# Patient Record
Sex: FEMALE | Race: WHITE | NOT HISPANIC OR LATINO | URBAN - METROPOLITAN AREA
[De-identification: names, ages, dates, MRNs, and addresses within clinical notes are randomized per-mention and may not be internally consistent; named-entity substitution may affect disease eponyms.]

---

## 2020-07-01 VITALS — WEIGHT: 140 LBS | HEIGHT: 62 IN

## 2020-08-04 ENCOUNTER — APPOINTMENT (RX ONLY)
Dept: URBAN - METROPOLITAN AREA CLINIC 141 | Facility: CLINIC | Age: 60
Setting detail: DERMATOLOGY
End: 2020-08-04

## 2020-08-04 ENCOUNTER — APPOINTMENT (RX ONLY)
Dept: URBAN - METROPOLITAN AREA SURGERY CENTER 10 | Facility: SURGERY CENTER | Age: 60
Setting detail: DERMATOLOGY
End: 2020-08-04

## 2020-08-04 VITALS
WEIGHT: 140 LBS | DIASTOLIC BLOOD PRESSURE: 78 MMHG | HEIGHT: 62 IN | HEART RATE: 66 BPM | SYSTOLIC BLOOD PRESSURE: 137 MMHG

## 2020-08-04 VITALS
HEART RATE: 66 BPM | WEIGHT: 140 LBS | HEIGHT: 62 IN | DIASTOLIC BLOOD PRESSURE: 78 MMHG | SYSTOLIC BLOOD PRESSURE: 137 MMHG

## 2020-08-04 PROBLEM — C44.612 BASAL CELL CARCINOMA OF SKIN OF RIGHT UPPER LIMB, INCLUDING SHOULDER: Status: ACTIVE | Noted: 2020-08-04

## 2020-08-04 PROCEDURE — 17313 MOHS 1 STAGE T/A/L: CPT

## 2020-08-04 PROCEDURE — ? MOHS SURGERY

## 2020-08-04 PROCEDURE — ? PRESCRIPTION

## 2020-08-04 PROCEDURE — 12034 INTMD RPR S/TR/EXT 7.6-12.5: CPT

## 2020-08-04 PROCEDURE — ? REPAIR NOTE

## 2020-08-04 RX ORDER — CEPHALEXIN 500 MG/1
CAPSULE ORAL TID
Qty: 30 | Refills: 0 | Status: ERX | COMMUNITY
Start: 2020-08-04

## 2020-08-04 RX ORDER — FLUCONAZOLE 150 MG/1
TABLET ORAL
Qty: 1 | Refills: 0 | Status: ERX | COMMUNITY
Start: 2020-08-04

## 2020-08-04 RX ADMIN — CEPHALEXIN: 500 CAPSULE ORAL at 00:00

## 2020-08-04 RX ADMIN — FLUCONAZOLE: 150 TABLET ORAL at 00:00

## 2020-08-04 NOTE — PROCEDURE: MOHS SURGERY
Biopsy Photograph Reviewed: Yes
Consent Type: Consent 1 (Standard)
Eye Shield Used: No
Initial Size Of Lesion: 3.3
X Size Of Lesion In Cm (Optional): 2.1
Number Of Stages: 1
Primary Defect Length In Cm (Final Defect Size - Required For Flaps/Grafts): 4.4
Primary Defect Width In Cm (Final Defect Size - Required For Flaps/Grafts): 3.7
Repair Type: Referred to ASC for closure
Oculoplastic Surgeon Procedure Text (A): After obtaining clear surgical margins the patient was sent to oculoplastics for surgical repair.  The patient understands they will receive post-surgical care and follow-up from the referring physician's office.
Oculoplastic Surgeon Procedure Text (B): After obtaining clear surgical margins the patient was sent to oculoplastics for surgical repair.  The patient understands they will receive post-surgical care and follow-up from the referring physician's office.
Otolaryngologist Procedure Text (A): After obtaining clear surgical margins the patient was sent to otolaryngology for surgical repair.  The patient understands they will receive post-surgical care and follow-up from the referring physician's office.
Otolaryngologist Procedure Text (B): After obtaining clear surgical margins the patient was sent to otolaryngology for surgical repair.  The patient understands they will receive post-surgical care and follow-up from the referring physician's office.
Plastic Surgeon Procedure Text (A): After obtaining clear surgical margins the patient was sent to plastics for surgical repair.  The patient understands they will receive post-surgical care and follow-up from the referring physician's office.
Plastic Surgeon Procedure Text (B): After obtaining clear surgical margins the patient was sent to plastics for surgical repair.  The patient understands they will receive post-surgical care and follow-up from the referring physician's office.
Mid-Level Procedure Text (A): After obtaining clear surgical margins the patient was sent to a mid-level provider for surgical repair.  The patient understands they will receive post-surgical care and follow-up from the mid-level provider.
Mid-Level Procedure Text (B): After obtaining clear surgical margins the patient was sent to a mid-level provider for surgical repair.  The patient understands they will receive post-surgical care and follow-up from the mid-level provider.
Provider Procedure Text (A): After obtaining clear surgical margins the defect was repaired by another provider.
Asc Procedure Text (A): After obtaining clear surgical margins the patient was sent to an ASC for surgical repair.  The patient understands they will receive post-surgical care and follow-up from the ASC physician.
Asc Procedure Text (B): After obtaining clear surgical margins the patient was sent to an ASC for surgical repair.  The patient understands they will receive post-surgical care and follow-up from the ASC physician.
Suturegard Retention Suture: 2-0 Nylon
Retention Suture Bite Size: 3 mm
Length To Time In Minutes Device Was In Place: 10
Simple / Intermediate / Complex Repair - Final Wound Length In Cm: 0
Undermining Type: Entire Wound
Debridement Text: The wound edges were debrided prior to proceeding with the closure to facilitate wound healing.
Helical Rim Text: The closure involved the helical rim.
Vermilion Border Text: The closure involved the vermilion border.
Nostril Rim Text: The closure involved the nostril rim.
Retention Suture Text: Retention sutures were placed to support the closure and prevent dehiscence.
Location Indication Override (Is Already Calculated Based On Selected Body Location): Area L
Area H Indication Text: Tumors in this location are included in Area H (eyelids, eyebrows, nose, lips, chin, ear, pre-auricular, post-auricular, temple, genitalia, hands, feet, ankles and areola). Tissue conservation is critical in these anatomic locations. Also, tumors outside of this area but with aggressive features or in the setting of immunosuppression would qualify for Mohs surgery.
Area M Indication Text: Tumors in this location are included in Area M (cheek, forehead, scalp, neck, jawline and pretibial skin).  Mohs surgery is indicated for tumors in these anatomic locations.
Area L Indication Text: Tumors in this location are included in Area L (trunk and extremities).  Mohs surgery is indicated for larger tumors, or tumors with aggressive histologic features, in these anatomic locations.
Tumor Debulked?: curette
Depth Of Tumor Invasion (For Histology): tumor not visualized (deep and peripheral margins are clear of tumor)
Perineural Invasion (For Histology - Be Specific If Possible): absent
Special Stains Stage 1 - Results: Base On Clearance Noted Above
Stage 2: Additional Anesthesia Type: 1% lidocaine with epinephrine
Include Tumor Staging In Mohs Note?: Please Select the Appropriate Response
Staging Info: By selecting yes to the question above you will include information on AJCC 8 tumor staging in your Mohs note. Information on tumor staging will be automatically added for SCCs on the head and neck. AJCC 8 includes tumor size, tumor depth, perineural involvement and bone invasion.
Tumor Depth: Less than 6mm from granular layer and no invasion beyond the subcutaneous fat
Medical Necessity Statement: Based on my medical judgement, Mohs surgery is the most appropriate treatment for this cancer compared to alternative treatments.
Alternatives Discussed Intro (Do Not Add Period): I discussed alternative treatments to Mohs surgery and specifically discussed the risks and benefits of
Consent 1/Introductory Paragraph: The rationale for Mohs surgery was explained to the patient, and informed consent was obtained.  The risks, benefits and alternatives to therapy were discussed in detail. Specifically, the risks of infection, scarring, bleeding, prolonged wound healing, incomplete removal, allergy to anesthesia, bruising and/or swelling, nerve injury and recurrence were addressed. Prior to the procedure, the treatment site was clearly identified and confirmed by the patient. All components of Universal Protocol/PAUSE Rule completed.
Consent 2/Introductory Paragraph: Mohs surgery was explained to the patient, and informed consent was obtained. The risks, benefits and alternatives to therapy were discussed in detail. Specifically, the risks of infection, scarring, bleeding, prolonged wound healing, incomplete removal, allergy to anesthesia, bruising and/or swelling, nerve injury and recurrence were addressed. Prior to the procedure, the treatment site was clearly identified and confirmed by the patient. All components of Universal Protocol/PAUSE Rule completed.
Consent 3/Introductory Paragraph: I gave the patient a chance to ask questions they had about the procedure.  Following this I explained the Mohs procedure and consent was obtained. The risks, benefits and alternatives to therapy were discussed in detail. Specifically, the risks of infection, scarring, bleeding, prolonged wound healing, incomplete removal, allergy to anesthesia, bruising and/or swelling, nerve injury and recurrence were addressed. Prior to the procedure, the treatment site was clearly identified and confirmed by the patient. All components of Universal Protocol/PAUSE Rule completed.
Consent (Temporal Branch)/Introductory Paragraph: The rationale for Mohs surgery was explained to the patient, and informed consent was obtained. The risks, benefits and alternatives to therapy were discussed in detail. Specifically, the risks of damage to the temporal branch of the facial nerve, infection, scarring, bleeding, prolonged wound healing, bruising and/or swelling, incomplete removal, allergy to anesthesia, and recurrence were addressed. Prior to the procedure, the treatment site was clearly identified and confirmed by the patient. All components of Universal Protocol/PAUSE Rule completed.
Consent (Marginal Mandibular)/Introductory Paragraph: The rationale for Mohs surgery was explained to the patient, and informed consent was obtained. The risks, benefits and alternatives to therapy were discussed in detail. Specifically, the risks of damage to the marginal mandibular branch of the facial nerve, bruising and/or swelling, infection, scarring, bleeding, prolonged wound healing, incomplete removal, allergy to anesthesia, and recurrence were addressed. Prior to the procedure, the treatment site was clearly identified and confirmed by the patient. All components of Universal Protocol/PAUSE Rule completed.
Consent (Spinal Accessory)/Introductory Paragraph: The rationale for Mohs surgery was explained to the patient, and informed consent was obtained. The risks, benefits and alternatives to therapy were discussed in detail. Specifically, the risks of damage to the spinal accessory nerve, infection, scarring, bleeding, prolonged wound healing, incomplete removal, bruising and/or swelling, allergy to anesthesia, and recurrence were addressed. Prior to the procedure, the treatment site was clearly identified and confirmed by the patient. All components of Universal Protocol/PAUSE Rule completed.
Consent (Near Eyelid Margin)/Introductory Paragraph: The rationale for Mohs surgery was explained to the patient, and informed consent was obtained. The risks, benefits and alternatives to therapy were discussed in detail. Specifically, the risks of ectropion or eyelid deformity, infection, scarring, bleeding, prolonged wound healing, incomplete removal, allergy to anesthesia, bruising and/or swelling, nerve injury and recurrence were addressed. Prior to the procedure, the treatment site was clearly identified and confirmed by the patient. All components of Universal Protocol/PAUSE Rule completed.
Consent (Ear)/Introductory Paragraph: The rationale for Mohs surgery was explained to the patient, and informed consent was obtained. The risks, benefits and alternatives to therapy were discussed in detail. Specifically, the risks of ear deformity, infection, scarring, bleeding, prolonged wound healing, incomplete removal, allergy to anesthesia, bruising and/or swelling, nerve injury and recurrence were addressed. Prior to the procedure, the treatment site was clearly identified and confirmed by the patient. All components of Universal Protocol/PAUSE Rule completed.
Consent (Nose)/Introductory Paragraph: The rationale for Mohs surgery was explained to the patient, and informed consent was obtained. The risks, benefits and alternatives to therapy were discussed in detail. Specifically, the risks of nasal deformity, changes in the flow of air through the nose, infection, scarring, bleeding, prolonged wound healing, incomplete removal, allergy to anesthesia, bruising and/or swelling, nerve injury and recurrence were addressed. Prior to the procedure, the treatment site was clearly identified and confirmed by the patient. All components of Universal Protocol/PAUSE Rule completed.
Consent (Lip)/Introductory Paragraph: The rationale for Mohs surgery was explained to the patient, and informed consent was obtained. The risks, benefits and alternatives to therapy were discussed in detail. Specifically, the risks of lip deformity, changes in the oral aperture, infection, scarring, bleeding, prolonged wound healing, incomplete removal, allergy to anesthesia, bruising and/or swelling, dental/diet precautions, nerve injury and recurrence were addressed. Prior to the procedure, the treatment site was clearly identified and confirmed by the patient. All components of Universal Protocol/PAUSE Rule completed.
Consent (Scalp)/Introductory Paragraph: The rationale for Mohs surgery was explained to the patient, and informed consent was obtained. The risks, benefits and alternatives to therapy were discussed in detail. Specifically, the risks of changes in hair growth pattern secondary to repair, infection, scarring, bleeding, prolonged wound healing, incomplete removal, allergy to anesthesia, bruising and/or swelling, nerve injury and recurrence were addressed. Prior to the procedure, the treatment site was clearly identified and confirmed by the patient. All components of Universal Protocol/PAUSE Rule completed.
Detail Level: Detailed
Postop Diagnosis: same
Surgeon: Rashida Segovia M.D.
Anesthesia Type: 1% lidocaine with 1:100,000 epinephrine and a 1:10 solution of 8.4% sodium bicarbonate
Anesthesia Volume In Cc: 15
Hemostasis: Electrocautery
Estimated Blood Loss (Cc): minimal
Epidermal Sutures: 5-0 Plain Gut
Epidermal Closure: simple interrupted
Suturegard Intro: Intraoperative tissue expansion was performed, utilizing the SUTUREGARD device, in order to reduce wound tension.
Suturegard Body: The suture ends were repeatedly re-tightened and re-clamped to achieve the desired tissue expansion.
Hemigard Intro: Due to skin fragility and wound tension, it was decided to use HEMIGARD adhesive retention suture devices to permit a linear closure. The skin was cleaned and dried for a 6cm distance away from the wound. Excessive hair, if present, was removed to allow for adhesion.
Hemigard Postcare Instructions: The HEMIGARD strips are to remain completely dry for at least 5-7 days.
Donor Site Anesthesia Type: same as repair anesthesia
Closure 2 Information: This tab is for additional flaps and grafts, including complex repair and grafts and complex repair and flaps. You can also specify a different location for the additional defect, if the location is the same you do not need to select a new one. We will insert the automated text for the repair you select below just as we do for solitary flaps and grafts. Please note that at this time if you select a location with a different insurance zone you will need to override the ICD10 and CPT if appropriate.
Closure 3 Information: This tab is for additional flaps and grafts above and beyond our usual structured repairs.  Please note if you enter information here it will not currently bill and you will need to add the billing information manually.
Closure 4 Information: This tab is for additional flaps and grafts above and beyond our usual structured repairs.  Please note if you enter information here it will not currently bill and you will need to add the billing information manually.
Wound Care: Vaseline
Dressing: pressure dressing with telfa
Wound Care (No Sutures): Petrolatum
Dressing (No Sutures): dry sterile dressing
Opioid Counseling: Opioid precautions were reviewed in detail and a written opioid handout was provided. The patient was reminded of the controlled nature of this prescription and was counseled to maintain control of this prescription (including appropriate disposal of unused mediation with a local pharmacy or law enforcement agency upon completion of the healing process).
Pain Refusal Text: I offered to prescribe pain medication, but the patient declined.
Mauc Instructions: By selecting yes to the question below the MAUC number will be added into the note.  This will be calculated automatically based on the diagnosis chosen, the size entered, the body zone selected (H,M,L) and the specific indications you chose. You will also have the option to override the Mohs AUC if you disagree with the automatically calculated number and this option is found in the Case Summary tab.
Where Do You Want The Question To Include Opioid Counseling Located?: Case Summary Tab
Eye Protection Verbiage: Before proceeding with the stage, a plastic scleral shield was inserted. The globe was anesthetized with a few drops of 1% lidocaine with 1:100,000 epinephrine. Then, an appropriate sized scleral shield was chosen and coated with lacrilube ointment. The shield was gently inserted and left in place for the duration of each stage. After the stage was completed, the shield was gently removed.
Mohs Method Verbiage: The proposed Mohs layer was incised with a #15 scalpel utilizing a 45 degree angle then sharply resected from the underlying tissue. The tissue sample was transferred to an oriented gauze upon a labelled petri dish for transfer to the Mohs laboratory.
Surgeon/Pathologist Verbiage (Will Incorporate Name Of Surgeon From Intro If Not Blank): operated in two distinct and integrated capacities as the surgeon and pathologist.
Mohs Histo Method Verbiage: The specimen was subsectioned as needed. Each section was then chromacoded and processed in the Mohs lab using the Mohs protocol for frozen section processing.
Subsequent Stages Histo Method Verbiage: Using a similar technique to that described above, a thin layer of tissue was resected from all areas where tumor was identified on the previous layer. The excised tissue was oriented, mapped, inked, and processed in a similar fashion as above.
Mohs Rapid Report Verbiage: The area of clinically-evident tumor was demarcated with Gentian violet. The initial incision was performed following the Mohs technique, and orienting niches were placed as needed. The excised specimen was transferred to the Mohs lab on oriented gauze, subsectioned as needed, mapped, chromacoded and processed according to the Mohs laboratory protocol. This process was repeated in successive stages (with additional resection of positive tumor margins based upon histology) until a tumor-free defect was achieved.
Complex Repair Preamble Text (Leave Blank If You Do Not Want): Given the location of the defect, the need to minimize contour irregularities, and to optimize functional and cosmetic outcomes, a complex layered closure was deemed most appropriate.  Using Gentian violet, the proposed Burow's triangles were drawn incorporating the defect and placing the anticipated incision within relaxed skin tension lines where possible. The area thus outlined was incised to the appropriate tissue plane with a scalpel blade. The defect edges were debeveled with a #15 scalpel blade.  Extensive undermining was performed to reduce wound edge tension and to allow optimal closure of the defect. The extent of the undermining extended to at least the same width as the width of the defect (when measured perpendicular to the proposed incision line), and underming was performed along at least one entire length of the incision (if not both sides).  Hemostasis was adequately achieved.
Intermediate Repair Preamble Text (Leave Blank If You Do Not Want): Given the location of the defect, the need to minimize contour irregularities, and to optimize functional and cosmetic outcomes, an intermediate layered closure was deemed most appropriate.  Using Gentian violet, the proposed Burow's triangles were drawn incorporating the defect and placing the anticipated incision within relaxed skin tension lines where possible. The area thus outlined was incised to the appropriate tissue plane with a scalpel blade. The defect edges were debeveled with a #15 scalpel blade.  Wide undermining was performed as needed to reduce wound edge tension and to allow optimal closure of the defect. Hemostasis was adequately achieved.
Non-Graft Cartilage Fenestration Text: The cartilage was fenestrated using a 2 mm punch trephine to facilitate granulation and healing.
Graft Cartilage Fenestration Text: The cartilage was fenestrated with a 2mm punch biopsy to help facilitate graft survival and healing.
Secondary Intention Text (Leave Blank If You Do Not Want): The options of secondary intention healing, linear layered closure, graft, and flap reconstruction were reviewed with the patient. The patient preferred healing by secondary intention. The wound was bandaged with a compression dressing. Post-operative care was reviewed in detail and a written handout was provided. The patient will follow-up in the near future for a wound check. Upon full healing, the patient will follow-up with the dermatology provider for ongoing cutaneous surveillance.
No Repair - Repaired With Adjacent Surgical Defect Text (Leave Blank If You Do Not Want): After obtaining clear surgical margins, the defect was reconstructed as a combined closure with the neighboring surgical defect.
Referred To Oculoplastics For Closure Text (Leave Blank If You Do Not Want): After obtaining clear surgical margins, the patient was referred to oculoplastics for reconstruction. The patient will receive post-operative instructions from the reconstructive surgeon. Interval instructions were reviewed verbally and a written handout was provided. Plan ongoing cutaneous surveillance with the general dermatology.
Referred To Otolaryngology For Closure Text (Leave Blank If You Do Not Want): After obtaining clear surgical margins, the patient was referred to a head/neck surgeon for reconstruction. The patient will receive post-operative instructions from the reconstructive surgeon. Interval instructions were reviewed verbally and a written handout was provided. Plan ongoing cutaneous surveillance with the general dermatology.
Referred To Plastics For Closure Text (Leave Blank If You Do Not Want): After obtaining clear surgical margins, the patient was referred to a plastic surgeon for reconstruction. The patient will receive post-operative instructions from the reconstructive surgeon. Interval instructions were reviewed verbally and a written handout was provided. Plan ongoing cutaneous surveillance with the general dermatology.
Referred To Asc For Closure Text (Leave Blank If You Do Not Want): After obtaining clear surgical margins, the patient was referred to the ASC for reconstruction. Post-operative care will be reviewed by the ASC staff.
Referred To Mid-Level For Closure Text (Leave Blank If You Do Not Want): After obtaining clear surgical margins, the patient was referred to the ASC for reconstruction. Post-operative care will be reviewed by the ASC staff.
Repair Performed By Another Provider Text (Leave Blank If You Do Not Want): After obtaining clear surgical margins, the patient was referred to another surgeon for reconstruction. The patient will receive post-operative instructions from the reconstructive surgeon. Interval instructions were reviewed verbally and a written handout was provided. Plan ongoing cutaneous surveillance with the general dermatology.
Advancement Flap (Single) Text: Given the location of the defect, the need to minimize contour irregularities, and to optimize functional and cosmetic outcomes, an advancement flap reconstruction was deemed most appropriate.  Using Gentian violet, the proposed flap was drawn incorporating the defect and placing the anticipated incision within relaxed skin tension lines where possible. The area thus outlined was incised to the appropriate tissue plane with a scalpel blade. The flap and surrounding wound edges were undermined broadly as needed to reduce tension, and the defect edges were debeveled as needed. Hemostasis was adequately achieved.\\n\\n
Advancement Flap (Double) Text: Given the location of the defect, the need to minimize contour irregularities, and to optimize functional and cosmetic outcomes, an advancement flap reconstruction was deemed most appropriate.  Using Gentian violet, the proposed flap was drawn incorporating the defect and placing the anticipated incision within relaxed skin tension lines where possible. The area thus outlined was incised to the appropriate tissue plane with a scalpel blade. The flap and surrounding wound edges were undermined broadly as needed to reduce tension, and the defect edges were debeveled as needed. Hemostasis was adequately achieved.
Burow's Advancement Flap Text: Given the location of the defect, the need to minimize contour irregularities, and to optimize functional and cosmetic outcomes, a Burow's advancement flap reconstruction was deemed most appropriate.  Using Gentian violet, the proposed flap was drawn incorporating the defect and placing the anticipated incision within relaxed skin tension lines where possible. The area thus outlined was incised to the appropriate tissue plane with a scalpel blade. The flap and surrounding wound edges were undermined broadly as needed to reduce tension, and the defect edges were debeveled as needed. Hemostasis was adequately achieved.
Chonodrocutaneous Helical Advancement Flap Text: Given the location of the defect, the need to minimize contour irregularities, and to optimize functional and cosmetic outcomes, a chondrocutaneous advancement flap reconstruction was deemed most appropriate.  Using Gentian violet, the proposed flap was drawn incorporating the defect and placing the anticipated incision within relaxed skin tension lines where possible. The area thus outlined was incised to the appropriate tissue plane with a scalpel blade. The flap and surrounding wound edges were undermined broadly as needed to reduce tension, and the defect edges were debeveled as needed. Hemostasis was adequately achieved.
Crescentic Advancement Flap Text: Given the location of the defect, the need to minimize contour irregularities, and to optimize functional and cosmetic outcomes, a crescentic advancement flap reconstruction was deemed most appropriate.  Using Gentian violet, the proposed flap was drawn incorporating the defect and placing the anticipated incision within relaxed skin tension lines where possible. The area thus outlined was incised to the appropriate tissue plane with a scalpel blade. The flap and surrounding wound edges were undermined broadly as needed to reduce tension, and the defect edges were debeveled as needed. Hemostasis was adequately achieved.
A-T Advancement Flap Text: Given the location of the defect, the need to minimize contour irregularities, and to optimize functional and cosmetic outcomes, an \"A to T\" advancement flap reconstruction was deemed most appropriate.  Using Gentian violet, the proposed flap was drawn incorporating the defect and placing the anticipated incision within relaxed skin tension lines where possible. The area thus outlined was incised to the appropriate tissue plane with a scalpel blade. The flap and surrounding wound edges were undermined broadly as needed to reduce tension, and the defect edges were debeveled as needed. Hemostasis was adequately achieved.
O-T Advancement Flap Text: Given the location of the defect, the need to minimize contour irregularities, and to optimize functional and cosmetic outcomes, an \"O to T\" advancement flap reconstruction was deemed most appropriate.  Using Gentian violet, the proposed flap was drawn incorporating the defect and placing the anticipated incision within relaxed skin tension lines where possible. The area thus outlined was incised to the appropriate tissue plane with a scalpel blade. The flap and surrounding wound edges were undermined broadly as needed to reduce tension, and the defect edges were debeveled as needed. Hemostasis was adequately achieved.
O-L Flap Text: Given the location of the defect, the need to minimize contour irregularities, and to optimize functional and cosmetic outcomes, an \"O to L\" advancement flap reconstruction was deemed most appropriate.  Using Gentian violet, the proposed flap was drawn incorporating the defect and placing the anticipated incision within relaxed skin tension lines where possible. The area thus outlined was incised to the appropriate tissue plane with a scalpel blade. The flap and surrounding wound edges were undermined broadly as needed to reduce tension, and the defect edges were debeveled as needed. Hemostasis was adequately achieved.
O-Z Flap Text: Given the location of the defect, the need to minimize contour irregularities, and to optimize functional and cosmetic outcomes, an \"O to Z\" advancement flap reconstruction was deemed most appropriate.  Using Gentian violet, the proposed flap was drawn incorporating the defect and placing the anticipated incision within relaxed skin tension lines where possible. The area thus outlined was incised to the appropriate tissue plane with a scalpel blade. The flap and surrounding wound edges were undermined broadly as needed to reduce tension, and the defect edges were debeveled as needed. Hemostasis was adequately achieved.
Double O-Z Flap Text: Given the location of the defect, the need to minimize contour irregularities, and to optimize functional and cosmetic outcomes, an \"O to Z\" advancement flap reconstruction was deemed most appropriate.  Using Gentian violet, the proposed flap was drawn incorporating the defect and placing the anticipated incision within relaxed skin tension lines where possible. The area thus outlined was incised to the appropriate tissue plane with a scalpel blade. The flap and surrounding wound edges were undermined broadly as needed to reduce tension, and the defect edges were debeveled as needed. Hemostasis was adequately achieved.
V-Y Flap Text: Given the location of the defect, the need to minimize contour irregularities, and to optimize functional and cosmetic outcomes, an \"V to Y\" advancement flap reconstruction was deemed most appropriate.  Using Gentian violet, the proposed flap was drawn incorporating the defect and placing the anticipated incision within relaxed skin tension lines where possible. The area thus outlined was incised to the appropriate tissue plane with a scalpel blade. The flap and surrounding wound edges were undermined broadly as needed to reduce tension, and the defect edges were debeveled as needed. Hemostasis was adequately achieved.
Advancement-Rotation Flap Text: Given the location of the defect, the need to minimize contour irregularities, and to optimize functional and cosmetic outcomes, a modified advancement flap reconstruction was deemed most appropriate.  Using Gentian violet, the proposed flap was drawn incorporating the defect and placing the anticipated incision within relaxed skin tension lines where possible. The area thus outlined was incised to the appropriate tissue plane with a scalpel blade. The flap and surrounding wound edges were undermined broadly as needed to reduce tension, and the defect edges were debeveled as needed. Hemostasis was adequately achieved.
Mercedes Flap Text: Given the location of the defect, the need to minimize contour irregularities, and to optimize functional and cosmetic outcomes, a \"Mercedes\" flap reconstruction was deemed most appropriate.  Using Gentian violet, the proposed flap was drawn incorporating the defect and placing the anticipated incision within relaxed skin tension lines where possible. The area thus outlined was incised to the appropriate tissue plane with a scalpel blade. The flap and surrounding wound edges were undermined broadly as needed to reduce tension, and the defect edges were debeveled as needed. Hemostasis was adequately achieved.
Modified Advancement Flap Text: Given the location of the defect, the need to minimize contour irregularities, and to optimize functional and cosmetic outcomes, a modified advancement flap reconstruction was deemed most appropriate.  Using Gentian violet, the proposed flap was drawn incorporating the defect and placing the anticipated incision within relaxed skin tension lines where possible. The area thus outlined was incised to the appropriate tissue plane with a scalpel blade. The flap and surrounding wound edges were undermined broadly as needed to reduce tension, and the defect edges were debeveled as needed. Hemostasis was adequately achieved.
Mucosal Advancement Flap Text: Given the location of the defect, the need to minimize contour irregularities, and to optimize functional and cosmetic outcomes, a mucosal advancement flap reconstruction was deemed most appropriate.  Using Gentian violet, the proposed flap was drawn incorporating the defect and placing the anticipated incision within relaxed skin tension lines where possible. The area thus outlined was incised to the appropriate tissue plane with a scalpel blade. The flap and surrounding wound edges were undermined broadly (including beneath the submucosal junction) as needed to reduce tension, and the defect edges were debeveled as needed. Hemostasis was adequately achieved.
Peng Advancement Flap Text: The defect edges were debeveled with a #15 scalpel blade.  Given the location of the defect, shape of the defect and the proximity to free margins a Peng advancement flap was deemed most appropriate.  Using a sterile surgical marker, an appropriate advancement flap was drawn incorporating the defect and placing the expected incisions within the relaxed skin tension lines where possible. The area thus outlined was incised deep to adipose tissue with a #15 scalpel blade.  The skin margins were undermined to an appropriate distance in all directions utilizing iris scissors.
Hatchet Flap Text: Given the location of the defect, the need to minimize contour irregularities, and to optimize functional and cosmetic outcomes, a dorsal nasal \"hatchet\" rotation flap reconstruction was deemed most appropriate.  Using Gentian violet, the proposed flap was drawn incorporating the defect and placing the anticipated incision within relaxed skin tension lines where possible. The area thus outlined was incised to the appropriate tissue plane with a scalpel blade. The flap and surrounding wound edges were undermined broadly as needed to reduce tension, and the defect edges were debeveled as needed. Hemostasis was adequately achieved.
Rotation Flap Text: Given the location of the defect, the need to minimize contour irregularities, and to optimize functional and cosmetic outcomes, a rotation flap reconstruction was deemed most appropriate.  Using Gentian violet, the proposed flap was drawn incorporating the defect and placing the anticipated incision within relaxed skin tension lines where possible. The area thus outlined was incised to the appropriate tissue plane with a scalpel blade. The flap and surrounding wound edges were undermined broadly as needed to reduce tension, and the defect edges were debeveled as needed. Hemostasis was adequately achieved.
Spiral Flap Text: Given the location of the defect, the need to minimize contour irregularities, and to optimize functional and cosmetic outcomes, a spiral flap reconstruction was deemed most appropriate.  Using Gentian violet, the proposed flap was drawn incorporating the defect and placing the anticipated incision within relaxed skin tension lines where possible. The area thus outlined was incised to the appropriate tissue plane with a scalpel blade. The flap and surrounding wound edges were undermined broadly as needed to reduce tension, and the defect edges were debeveled as needed. Hemostasis was adequately achieved.
Star Wedge Flap Text: Given the location of the defect, the need to minimize contour irregularities, and to optimize functional and cosmetic outcomes, a \"star-wedge\" advancement flap reconstruction was deemed most appropriate.  Using Gentian violet, the proposed flap was drawn incorporating the defect and placing the anticipated incision within relaxed skin tension lines where possible. The area thus outlined was incised to the appropriate tissue plane with a scalpel blade. The flap and surrounding wound edges were undermined broadly as needed to reduce tension, and the defect edges were debeveled as needed. Hemostasis was adequately achieved.
Transposition Flap Text: Given the location of the defect, the need to minimize contour irregularities, and to optimize functional and cosmetic outcomes, a transposition flap reconstruction was deemed most appropriate.  Using Gentian violet, the proposed flap was drawn incorporating the defect and placing the anticipated incision within relaxed skin tension lines where possible. The area thus outlined was incised to the appropriate tissue plane with a scalpel blade. The flap and surrounding wound edges were undermined broadly as needed to reduce tension, and the defect edges were debeveled as needed. Hemostasis was adequately achieved.
Muscle Hinge Flap Text: Given the location of the defect, the need to minimize contour irregularities, and to optimize functional and cosmetic outcomes, a muscle hinge flap reconstruction was deemed most appropriate.  Using Gentian violet, the proposed flap was drawn incorporating the defect and placing the anticipated incision within relaxed skin tension lines where possible. The area thus outlined was incised to the appropriate tissue plane with a scalpel blade. Resected Burow's triangle(s) was/(were) reserved on sterile saline-soaked gauze for use as a superimposed graft overlying the muscularis hinge flap. The cutaneous wound edges and the muscularis flap were undermined broadly as needed to reduce tension and to create an appropriate wound bed for the proposed graft. Defect edges were debeveled as needed. Hemostasis was adequately achieved.
Melolabial Transposition Flap Text: Given the location of the defect, the need to minimize contour irregularities, and to optimize functional and cosmetic outcomes, a melolabial transposition flap reconstruction was deemed most appropriate.  Using Gentian violet, the proposed flap was drawn incorporating the defect and placing the anticipated incision within relaxed skin tension lines where possible. The area thus outlined was incised to the appropriate tissue plane with a scalpel blade. The flap and surrounding wound edges were undermined broadly as needed to reduce tension, and the defect edges were debeveled as needed. Hemostasis was adequately achieved.
Rhombic Flap Text: Given the location of the defect, the need to minimize contour irregularities, and to optimize functional and cosmetic outcomes, a rhombic transposition flap reconstruction was deemed most appropriate.  Using Gentian violet, the proposed flap was drawn incorporating the defect and placing the anticipated incision within relaxed skin tension lines where possible. The area thus outlined was incised to the appropriate tissue plane with a scalpel blade. The flap and surrounding wound edges were undermined broadly as needed to reduce tension, and the defect edges were debeveled as needed. Hemostasis was adequately achieved.
Rhomboid Transposition Flap Text: Given the location of the defect, the need to minimize contour irregularities, and to optimize functional and cosmetic outcomes, a rhombic transposition flap reconstruction was deemed most appropriate.  Using Gentian violet, the proposed flap was drawn incorporating the defect and placing the anticipated incision within relaxed skin tension lines where possible. The area thus outlined was incised to the appropriate tissue plane with a scalpel blade. The flap and surrounding wound edges were undermined broadly as needed to reduce tension, and the defect edges were debeveled as needed. Hemostasis was adequately achieved.
Bi-Rhombic Flap Text: Given the location of the defect, the need to minimize contour irregularities, and to optimize functional and cosmetic outcomes, a bi-rhombic transposition flap reconstruction was deemed most appropriate.  Using Gentian violet, the proposed flap was drawn incorporating the defect and placing the anticipated incision within relaxed skin tension lines where possible. The area thus outlined was incised to the appropriate tissue plane with a scalpel blade. The flap and surrounding wound edges were undermined broadly as needed to reduce tension, and the defect edges were debeveled as needed. Hemostasis was adequately achieved.
Helical Rim Advancement Flap Text: Given the location of the defect, the need to minimize contour irregularities, and to optimize functional and cosmetic outcomes, a helical rim advancement flap reconstruction was deemed most appropriate.  Using Gentian violet, the proposed flap was drawn incorporating the defect and placing the anticipated incision within relaxed skin tension lines where possible. The area thus outlined was incised to the appropriate tissue plane with a scalpel blade. The flap and surrounding wound edges were undermined broadly as needed to reduce tension, and the defect edges were debeveled as needed. Hemostasis was adequately achieved.
Bilateral Helical Rim Advancement Flap Text: Given the location of the defect, the need to minimize contour irregularities, and to optimize functional and cosmetic outcomes, a bilateral helical rim advancement flap reconstruction was deemed most appropriate.  Using Gentian violet, the proposed flap was drawn incorporating the defect and placing the anticipated incision within relaxed skin tension lines where possible. The area thus outlined was incised to the appropriate tissue plane with a scalpel blade. The flap and surrounding wound edges were undermined broadly as needed to reduce tension, and the defect edges were debeveled as needed. Hemostasis was adequately achieved.
Ear Star Wedge Flap Text: Given the location of the defect, the need to minimize contour irregularities, and to optimize functional and cosmetic outcomes, an ear \"star\" wedge advancement flap reconstruction was deemed most appropriate.  Using Gentian violet, the proposed flap was drawn incorporating the defect and placing the anticipated incision within relaxed skin tension lines where possible. The area thus outlined was incised to the appropriate tissue plane with a scalpel blade. The flap and surrounding wound edges were undermined broadly as needed to reduce tension, and the defect edges were debeveled as needed. Hemostasis was adequately achieved.
Banner Transposition Flap Text: Given the location of the defect, the need to minimize contour irregularities, and to optimize functional and cosmetic outcomes, a banner transposition flap reconstruction was deemed most appropriate.  Using Gentian violet, the proposed flap was drawn incorporating the defect and placing the anticipated incision within relaxed skin tension lines where possible. The area thus outlined was incised to the appropriate tissue plane with a scalpel blade. The flap and surrounding wound edges were undermined broadly as needed to reduce tension, and the defect edges were debeveled as needed. Hemostasis was adequately achieved.
Bilobed Flap Text: Given the location of the defect, the need to minimize contour irregularities, and to optimize functional and cosmetic outcomes, a bilobed transposition flap reconstruction was deemed most appropriate.  Using Gentian violet, the proposed flap was drawn incorporating the defect and placing the anticipated incision within relaxed skin tension lines where possible. The area thus outlined was incised to the appropriate tissue plane with a scalpel blade. The flap and surrounding wound edges were undermined broadly as needed to reduce tension, and the defect edges were debeveled as needed. Hemostasis was adequately achieved.
Bilobed Transposition Flap Text: Given the location of the defect, the need to minimize contour irregularities, and to optimize functional and cosmetic outcomes, a bilobed transposition flap reconstruction was deemed most appropriate.  Using Gentian violet, the proposed flap was drawn incorporating the defect and placing the anticipated incision within relaxed skin tension lines where possible. The area thus outlined was incised to the appropriate tissue plane with a scalpel blade. The flap and surrounding wound edges were undermined broadly as needed to reduce tension, and the defect edges were debeveled as needed. Hemostasis was adequately achieved.
Trilobed Flap Text: Given the location of the defect, the need to minimize contour irregularities, and to optimize functional and cosmetic outcomes, a trilobed transposition flap reconstruction was deemed most appropriate.  Using Gentian violet, the proposed flap was drawn incorporating the defect and placing the anticipated incision within relaxed skin tension lines where possible. The area thus outlined was incised to the appropriate tissue plane with a scalpel blade. The flap and surrounding wound edges were undermined broadly as needed to reduce tension, and the defect edges were debeveled as needed. Hemostasis was adequately achieved.
Dorsal Nasal Flap Text: Given the location of the defect, the need to minimize contour irregularities, and to optimize functional and cosmetic outcomes, a dorsal nasal \"hatchet\" rotation flap reconstruction was deemed most appropriate.  Using Gentian violet, the proposed flap was drawn incorporating the defect and placing the anticipated incision within relaxed skin tension lines where possible. The area thus outlined was incised to the appropriate tissue plane with a scalpel blade. The flap and surrounding wound edges were undermined broadly as needed to reduce tension, and the defect edges were debeveled as needed. Hemostasis was adequately achieved.
Island Pedicle Flap Text: Given the location of the defect, the need to minimize contour irregularities, and to optimize functional and cosmetic outcomes, an island pedicle flap reconstruction was deemed most appropriate.  Using Gentian violet, the proposed flap was drawn incorporating the defect and placing the anticipated incision within relaxed skin tension lines where possible. The area thus outlined was incised to the appropriate tissue plane with a scalpel blade. The flap and surrounding wound edges were undermined broadly as needed to reduce tension, and a tunnel of undermining was created under the length of the flap and into the surrounding subcutaneous tissue. Defect edges were debeveled as needed. Hemostasis was adequately achieved.
Island Pedicle Flap With Canthal Suspension Text: Given the location of the defect, the need to minimize contour irregularities, and to optimize functional and cosmetic outcomes, an island pedicle flap reconstruction was deemed most appropriate.  Using Gentian violet, the proposed flap was drawn incorporating the defect and placing the anticipated incision within relaxed skin tension lines where possible. The area thus outlined was incised to the appropriate tissue plane with a scalpel blade. The flap and surrounding wound edges were undermined broadly as needed to reduce tension, and a tunnel of undermining was created under the length of the flap and into the surrounding subcutaneous tissue. Defect edges were debeveled as needed. Hemostasis was adequately achieved. A suspension suture was placed in the canthal tendon to prevent tension and prevent ectropion.
Alar Island Pedicle Flap Text: Given the location of the defect, the need to minimize contour irregularities, and to optimize functional and cosmetic outcomes, an island pedicle flap reconstruction was deemed most appropriate.  Using Gentian violet, the proposed flap was drawn incorporating the defect and placing the anticipated incision within relaxed skin tension lines where possible. The area thus outlined was incised to the appropriate tissue plane with a scalpel blade. The flap and surrounding wound edges were undermined broadly as needed to reduce tension, and a tunnel of undermining was created under the length of the flap and into the surrounding subcutaneous tissue. Defect edges were debeveled as needed. Hemostasis was adequately achieved.
Double Island Pedicle Flap Text: Given the location of the defect, the need to minimize contour irregularities, and to optimize functional and cosmetic outcomes, a double island pedicle flap reconstruction was deemed most appropriate.  Using Gentian violet, the proposed flap was drawn incorporating the defect and placing the anticipated incision within relaxed skin tension lines where possible. The area thus outlined was incised to the appropriate tissue plane with a scalpel blade. The flaps and surrounding wound edges were undermined broadly as needed to reduce tension, and a tunnel of undermining was created under the length of the flaps and into the surrounding subcutaneous tissue. Defect edges were debeveled as needed. Hemostasis was adequately achieved.
Island Pedicle Flap-Requiring Vessel Identification Text: Given the location of the defect, the need to minimize contour irregularities, and to optimize functional and cosmetic outcomes, an island pedicle flap reconstruction was deemed most appropriate.  Using Gentian violet, the proposed flap was drawn incorporating the defect and placing the anticipated incision within relaxed skin tension lines where possible. The area thus outlined was incised to the appropriate tissue plane with a scalpel blade. The named vessel (*) was identified and incorporated into the flap design. The flap and surrounding wound edges were undermined broadly as needed to reduce tension, and a tunnel of undermining was created under the length of the flap and into the surrounding subcutaneous tissue. Defect edges were debeveled as needed. Hemostasis was adequately achieved.
Keystone Flap Text: Given the location of the defect, the need to minimize contour irregularities, and to optimize functional and cosmetic outcomes, a keystone flap reconstruction was deemed most appropriate.  Using Gentian violet, the proposed flap was drawn incorporating the defect and placing the anticipated incision within relaxed skin tension lines where possible. The area thus outlined was incised to the appropriate tissue plane with a scalpel blade. The flap and surrounding wound edges were undermined broadly as needed to reduce tension, and defect edges were debeveled as needed. Hemostasis was adequately achieved.
O-T Plasty Text: Given the location of the defect, the need to minimize contour irregularities, and to optimize functional and cosmetic outcomes, a \"O to T\" plasty reconstruction was deemed most appropriate.  Using Gentian violet, the proposed flap was drawn incorporating the defect and placing the anticipated incision within relaxed skin tension lines where possible. The area thus outlined was incised to the appropriate tissue plane with a scalpel blade. The flap and surrounding wound edges were undermined broadly as needed to reduce tension, and defect edges were debeveled as needed. Hemostasis was adequately achieved.
O-Z Plasty Text: Given the location of the defect, the need to minimize contour irregularities, and to optimize functional and cosmetic outcomes, a \"O to Z\" plasty reconstruction was deemed most appropriate.  Using Gentian violet, the proposed flap was drawn incorporating the defect and placing the anticipated incision within relaxed skin tension lines where possible. The area thus outlined was incised to the appropriate tissue plane with a scalpel blade. The flap and surrounding wound edges were undermined broadly as needed to reduce tension, and defect edges were debeveled as needed. Hemostasis was adequately achieved.
Double O-Z Plasty Text: Given the location of the defect, the need to minimize contour irregularities, and to optimize functional and cosmetic outcomes, a double \"O to Z\" plasty reconstruction was deemed most appropriate.  Using Gentian violet, the proposed flaps were drawn incorporating the defect and placing the anticipated incision within relaxed skin tension lines where possible. The area thus outlined was incised to the appropriate tissue plane with a scalpel blade. The flaps and surrounding wound edges were undermined broadly as needed to reduce tension, and defect edges were debeveled as needed. Hemostasis was adequately achieved.
V-Y Plasty Text: Given the location of the defect, the need to minimize contour irregularities, and to optimize functional and cosmetic outcomes, an \"V to Y\"-plasty reconstruction was deemed most appropriate.  Using Gentian violet, the proposed flap was drawn incorporating the defect and placing the anticipated incision within relaxed skin tension lines where possible. The area thus outlined was incised to the appropriate tissue plane with a scalpel blade. The flap and surrounding wound edges were undermined broadly as needed to reduce tension, and defect edges were debeveled as needed. Hemostasis was adequately achieved.
H Plasty Text: Given the location of the defect, the need to minimize contour irregularities, and to optimize functional and cosmetic outcomes, an H-plasty reconstruction was deemed most appropriate.  Using Gentian violet, the proposed flap was drawn incorporating the defect and placing the anticipated incision within relaxed skin tension lines where possible. The area thus outlined was incised to the appropriate tissue plane with a scalpel blade. The flap and surrounding wound edges were undermined broadly as needed to reduce tension, and defect edges were debeveled as needed. Hemostasis was adequately achieved.
W Plasty Text: Given the location of the defect, the need to minimize contour irregularities, and to optimize functional and cosmetic outcomes, a W-plasty reconstruction was deemed most appropriate.  Using Gentian violet, the proposed flap was drawn incorporating the defect and placing the anticipated incision within relaxed skin tension lines where possible. The area thus outlined was incised to the appropriate tissue plane with a scalpel blade. The flap and surrounding wound edges were undermined broadly as needed to reduce tension, and defect edges were debeveled as needed. Hemostasis was adequately achieved.
Z Plasty Text: Given the location of the defect, the need to minimize contour irregularities, and to optimize functional and cosmetic outcomes, an Z-plasty reconstruction was deemed most appropriate.  Using Gentian violet, the proposed flap was drawn incorporating the defect and placing the anticipated incision within relaxed skin tension lines where possible. The area thus outlined was incised to the appropriate tissue plane with a scalpel blade. The flap and surrounding wound edges were undermined broadly as needed to reduce tension, and defect edges were debeveled as needed. Hemostasis was adequately achieved..
Zygomaticofacial Flap Text: Given the location of the defect, shape of the defect and the proximity to free margins a zygomaticofacial flap was deemed most appropriate for repair.  Using a sterile surgical marker, the appropriate flap was drawn incorporating the defect and placing the expected incisions within the relaxed skin tension lines where possible. The area thus outlined was incised deep to adipose tissue with a #15 scalpel blade with preservation of a vascular pedicle.  The skin margins were undermined to an appropriate distance in all directions utilizing iris scissors.  The flap was then placed into the defect and anchored with interrupted buried subcutaneous sutures.
Cheek Interpolation Flap Text: Given the location of the defect, the need to minimize contour irregularities, and to optimize functional and cosmetic outcomes, a cheek interpolation flap reconstruction was deemed most appropriate.  Using Gentian violet, the proposed flap was drawn incorporating the defect and placing the anticipated incision within relaxed skin tension lines where possible. The area thus outlined was incised to the appropriate tissue plane with a scalpel blade. The flap and surrounding wound edges were undermined broadly as needed to reduce tension, and the defect edges were debeveled as needed. Hemostasis was adequately achieved.
Cheek-To-Nose Interpolation Flap Text: Given the location of the defect, the need to minimize contour irregularities, and to optimize functional and cosmetic outcomes, a cheek to nose interpolation flap reconstruction was deemed most appropriate.  Using Gentian violet, the proposed flap was drawn incorporating the defect and placing the anticipated incision within relaxed skin tension lines where possible. The area thus outlined was incised to the appropriate tissue plane with a scalpel blade. The flap and surrounding wound edges were undermined broadly as needed to reduce tension, and the defect edges were debeveled as needed. Hemostasis was adequately achieved.
Interpolation Flap Text: Given the location of the defect, the need to minimize contour irregularities, and to optimize functional and cosmetic outcomes, an interpolation flap reconstruction was deemed most appropriate.  Using Gentian violet, the proposed flap was drawn incorporating the defect and placing the anticipated incision within relaxed skin tension lines where possible. The area thus outlined was incised to the appropriate tissue plane with a scalpel blade. The flap and surrounding wound edges were undermined broadly as needed to reduce tension, and the defect edges were debeveled as needed. Hemostasis was adequately achieved.
Melolabial Interpolation Flap Text: Given the location of the defect, the need to minimize contour irregularities, and to optimize functional and cosmetic outcomes, a melolabial interpolation flap reconstruction was deemed most appropriate.  Using Gentian violet, the proposed flap was drawn incorporating the defect and placing the anticipated incision within relaxed skin tension lines where possible. The area thus outlined was incised to the appropriate tissue plane with a scalpel blade. The flap and surrounding wound edges were undermined broadly as needed to reduce tension, and the defect edges were debeveled as needed. Hemostasis was adequately achieved.
Mastoid Interpolation Flap Text: Given the location of the defect, the need to minimize contour irregularities, and to optimize functional and cosmetic outcomes, a mastoid interpolation flap reconstruction was deemed most appropriate.  Using Gentian violet, the proposed flap was drawn incorporating the defect and placing the anticipated incision within relaxed skin tension lines where possible. The area thus outlined was incised to the appropriate tissue plane with a scalpel blade. The flap and surrounding wound edges were undermined broadly as needed to reduce tension, and the defect edges were debeveled as needed. Hemostasis was adequately achieved.
Posterior Auricular Interpolation Flap Text: Given the location of the defect, the need to minimize contour irregularities, and to optimize functional and cosmetic outcomes, a post-auricular interpolation flap reconstruction was deemed most appropriate.  Using Gentian violet, the proposed flap was drawn incorporating the defect and placing the anticipated incision within relaxed skin tension lines where possible. The area thus outlined was incised to the appropriate tissue plane with a scalpel blade. The flap and surrounding wound edges were undermined broadly as needed to reduce tension, and the defect edges were debeveled as needed. Hemostasis was adequately achieved. The flap was advanced over the defect and secured with 4-0 PDS buried vertical mattress sutures.
Paramedian Forehead Flap Text: A decision was made to reconstruct the defect utilizing an interpolation axial flap and a staged reconstruction.  A telfa template was made of the defect.  This telfa template was then used to outline the paramedian forehead pedicle flap.  The donor area for the pedicle flap was then injected with anesthesia.  The flap was excised through the skin and subcutaneous tissue down to the layer of the underlying musculature.  The pedicle flap was carefully excised within this deep plane to maintain its blood supply.  The edges of the donor site were undermined.   The donor site was closed in a primary fashion.  The pedicle was then rotated into position and sutured.  Once the tube was sutured into place, adequate blood supply was confirmed with blanching and refill.  The pedicle was then wrapped with xeroform gauze and dressed appropriately with a telfa and gauze bandage to ensure continued blood supply and protect the attached pedicle.
Cheiloplasty (Less Than 50%) Text: A decision was made to reconstruct the defect with a  cheiloplasty.  The defect was undermined extensively.  Additional obicularis oris muscle was excised with a 15 blade scalpel.  The defect was converted into a full thickness wedge, of less than 50% of the vertical height of the lip, to facilite a better cosmetic result.  Small vessels were then tied off with 5-0 monocyrl. The obicularis oris, superficial fascia, adipose and dermis were then reapproximated.  After the deeper layers were approximated the epidermis was reapproximated with particular care given to realign the vermillion border.
Cheiloplasty (Complex) Text: A decision was made to reconstruct the defect with a  cheiloplasty.  The defect was undermined extensively.  Additional obicularis oris muscle was excised with a 15 blade scalpel.  The defect was converted into a full thickness wedge to facilite a better cosmetic result.  Small vessels were then tied off with 5-0 monocyrl. The obicularis oris, superficial fascia, adipose and dermis were then reapproximated.  After the deeper layers were approximated the epidermis was reapproximated with particular care given to realign the vermillion border.
Ear Wedge Repair Text: A wedge excision was completed by carrying down an excision through the full thickness of the ear and cartilage with an inward facing Burow's triangle. The wound was then closed in a layered fashion.
Full Thickness Lip Wedge Repair (Flap) Text: Given the location of the defect, the need to minimize contour irregularities, and to optimize functional and cosmetic outcomes, a full thickness lip wedge flap reconstruction was deemed most appropriate.  Using Gentian violet, the proposed flap was drawn incorporating the defect and placing the anticipated incision within relaxed skin tension lines where possible. The area thus outlined was incised to the appropriate tissue plane with a scalpel blade. The flap and surrounding wound edges were undermined broadly as needed to reduce tension, and the defect edges were debeveled as needed. Hemostasis was adequately achieved. Care was taken to realign the vermilion border with suture placement.
Ftsg Text: Using a sterile surgical marker, the planned graft was demarcated at the donor site using gentian violet. The donor site and primary defects were anesthetized with local anesthesia. The graft area thus outlined was incised deep to adipose tissue with a #15 scalpel blade. The harvested graft was then trimmed of adipose tissue and was reserved on sterile gauze saturated with sterile normal saline. The skin margins of the secondary defect were undermined as needed. Hemostasis was adequately achieved. The donor site defect was approximated with buried vertical mattress sutures. The epidermal edges were approximately with interrupted and/or running epidermal sutures. The skin graft was then placed in the primary defect and oriented appropriately. The graft was trimmed to fit the defect.  \\n\\n
Split-Thickness Skin Graft Text: The defect edges were debeveled with a #15 scalpel blade.  Given the location of the defect, shape of the defect and the proximity to free margins a split thickness skin graft was deemed most appropriate.  Using a sterile surgical marker, the primary defect shape was transferred to the donor site. The split thickness graft was then harvested.  The skin graft was then placed in the primary defect and oriented appropriately.
Burow's Graft Text: The defect edges were debeveled with a #15 scalpel blade.  Given the location of the defect, shape of the defect, the proximity to free margins and the presence of a standing cone deformity a Burow's skin graft was deemed most appropriate. The standing cone was removed and this tissue was then trimmed to the shape of the primary defect. The adipose tissue was also removed until only dermis and epidermis were left.  The skin margins of the secondary defect were undermined to an appropriate distance in all directions utilizing iris scissors.  The secondary defect was closed with interrupted buried subcutaneous sutures.  The skin edges were then re-apposed with running  sutures.  The skin graft was then placed in the primary defect and oriented appropriately.
Cartilage Graft Text: The defect edges were debeveled with a #15 scalpel blade.  Given the location of the defect, shape of the defect, the fact the defect involved a full thickness cartilage defect a cartilage graft was deemed most appropriate.  An appropriate donor site was identified, cleansed, and anesthetized. The cartilage graft was then harvested and transferred to the recipient site, oriented appropriately and then sutured into place.  The secondary defect was then repaired using a primary closure.
Composite Graft Text: The defect edges were debeveled with a #15 scalpel blade.  Given the location of the defect, shape of the defect, the proximity to free margins and the fact the defect was full thickness a composite graft was deemed most appropriate.  The defect was outline and then transferred to the donor site.  A full thickness graft was then excised from the donor site. The graft was then placed in the primary defect, oriented appropriately and then sutured into place.  The secondary defect was then repaired using a primary closure.
Epidermal Autograft Text: The defect edges were debeveled with a #15 scalpel blade.  Given the location of the defect, shape of the defect and the proximity to free margins an epidermal autograft was deemed most appropriate.  Using a sterile surgical marker, the primary defect shape was transferred to the donor site. The epidermal graft was then harvested.  The skin graft was then placed in the primary defect and oriented appropriately.
Dermal Autograft Text: The defect edges were debeveled with a #15 scalpel blade.  Given the location of the defect, shape of the defect and the proximity to free margins a dermal autograft was deemed most appropriate.  Using a sterile surgical marker, the primary defect shape was transferred to the donor site. The area thus outlined was incised deep to adipose tissue with a #15 scalpel blade.  The harvested graft was then trimmed of adipose and epidermal tissue until only dermis was left.  The skin graft was then placed in the primary defect and oriented appropriately.
Skin Substitute Text: The defect edges were debeveled with a #15 scalpel blade.  Given the location of the defect, shape of the defect and the proximity to free margins a skin substitute graft was deemed most appropriate.  The graft material was trimmed to fit the size of the defect. The graft was then placed in the primary defect and oriented appropriately.
Tissue Cultured Epidermal Autograft Text: The defect edges were debeveled with a #15 scalpel blade.  Given the location of the defect, shape of the defect and the proximity to free margins a tissue cultured epidermal autograft was deemed most appropriate.  The graft was then trimmed to fit the size of the defect.  The graft was then placed in the primary defect and oriented appropriately.
Xenograft Text: The defect edges were debeveled with a #15 scalpel blade.  Given the location of the defect, shape of the defect and the proximity to free margins a xenograft was deemed most appropriate.  The graft was then trimmed to fit the size of the defect.  The graft was then placed in the primary defect and oriented appropriately.
Purse String (Simple) Text: Given the location of the defect and the characteristics of the surrounding skin a pursestring closure was deemed most appropriate.  Undermining was performed circumfirentially around the surgical defect.  A purstring suture was then placed and tightened.
Purse String (Intermediate) Text: Given the location of the defect and the characteristics of the surrounding skin a pursestring intermediate closure was deemed most appropriate.  Undermining was performed circumfirentially around the surgical defect.  A purstring suture was then placed and tightened.
Partial Purse String (Simple) Text: Given the location of the defect and the characteristics of the surrounding skin a simple purse string closure was deemed most appropriate.  Undermining was performed circumfirentially around the surgical defect.  A purse string suture was then placed and tightened. Wound tension only allowed a partial closure of the circular defect.
Partial Purse String (Intermediate) Text: Given the location of the defect and the characteristics of the surrounding skin an intermediate purse string closure was deemed most appropriate.  Undermining was performed circumfirentially around the surgical defect.  A purse string suture was then placed and tightened. Wound tension only allowed a partial closure of the circular defect.
Localized Dermabrasion Text: The patient was draped in routine manner.  Localized dermabrasion using 3 x 17 mm wire brush was performed in routine manner to papillary dermis. This spot dermabrasion is being performed to complete skin cancer reconstruction. It also will eliminate the other sun damaged precancerous cells that are known to be part of the regional effect of a lifetime's worth of sun exposure. This localized dermabrasion is therapeutic and should not be considered cosmetic in any regard.
Tarsorrhaphy Text: A tarsorrhaphy was performed using Frost sutures.
Complex Repair And Flap Additional Text (Will Appearing After The Standard Complex Repair Text): The complex repair was not sufficient to completely close the primary defect. The remaining additional defect was repaired with the flap mentioned below.
Complex Repair And Graft Additional Text (Will Appearing After The Standard Complex Repair Text): The complex repair was not sufficient to completely close the primary defect. The remaining additional defect was repaired with the graft mentioned below.
Manual Repair Warning Statement: We plan on removing the manually selected variable below in favor of our much easier automatic structured text blocks found in the previous tab. We decided to do this to help make the flow better and give you the full power of structured data. Manual selection is never going to be ideal in our platform and I would encourage you to avoid using manual selection from this point on, especially since I will be sunsetting this feature. It is important that you do one of two things with the customized text below. First, you can save all of the text in a word file so you can have it for future reference. Second, transfer the text to the appropriate area in the Library tab. Lastly, if there is a flap or graft type which we do not have you need to let us know right away so I can add it in before the variable is hidden. No need to panic, we plan to give you roughly 6 months to make the change.
Same Histology In Subsequent Stages Text: The pattern and morphology of the tumor is as described in the first stage.
No Residual Tumor Seen Histology Text: There were no malignant cells identified in the sections examined.
Inflammation Suggestive Of Cancer Camouflage Histology Text: There was a dense lymphocytic infiltrate from which tumor could not be excluded.
Area Suspicious For Tumor Histology Text: There was dense inflammation from which tumor could not be excluded.
Bcc Histology Text: Nests of atypical basaloid cells with peripheral palisading +/- mucin deposition were identified.
Bcc Infiltrative Histology Text: Residual tumor (infiltrative BCC) was identified.
Bcc Infundibulocystic Histology Text: Residual tumor (infundibulocystic BCC) was identified.
Bcc Micronodular Histology Text: Residual tumor (micronodular BCC) was identified.
Bcc  Morpheaform/Sclerosing Histology Text: Residual tumor (morpheaform BCC) was identified.
Bcc  Nodular Histology Text: Residual tumor (nodular BCC) was identified.
Bcc Pigmented Histology Text: Residual tumor (pigmented nodular BCC) was identified.
Bcc Superficial Histology Text: Residual tumor (superficial multifocal BCC) was identified.
Bcc Superficial Pigmented Histology Text: Residual tumor (pigmented superficial multifocal BCC) was identified.
Mixed Superficial And Nodular Bcc Histology Text: Residual tumor was identified (superficial multifocal and nodular BCC) was identified.
Mixed Nodular And Infiltrative Bcc Histology Text: Residual tumor was identified (nodular and infiltrative BCC).
Mixed Nodular And Micronodular Bcc Histology Text: Residual tumor was identified (nodular and micronodular BCC).
Metatypical Bcc Histology Text: Residual tumor was identified (basal cell carcinoma with squamous differentiation).
Scc Histology Text: Residual tumor was identified (well-differentiated SCC).
Scc Moderately Differentiated Histology Text: Residual tumor was identified (moderately-differentiated SCC).
Scc Poorly Differentiated Histology Text: Residual tumor was identified (poorly-differentiated SCC).
Scc Acantholytic Histology Text: Residual tumor was identified (acantholytic SCC).
Scc In Situ Histology Text: Residual tumor was identified (SCCIS).
Basosquamous Cell Carcinoma Histology Text: Residual tumor was identified (basosquamous carcinoma).
Desmoplastic Trichoepithelioma Histology Text: Residual tumor was identified (desmoplastic trichoepithelioma).
Mart-1 - Positive Histology Text: MART-1 staining demonstrates areas of higher density and clustering of melanocytes with Pagetoid spread upwards within the epidermis. The surgical margins are positive for tumor cells.
Mart-1 - Negative Histology Text: MART-1 staining demonstrates a normal density and pattern of melanocytes along the dermal-epidermal junction. The surgical margins are negative for tumor cells.
Information: Selecting Yes will display possible errors in your note based on the variables you have selected. This validation is only offered as a suggestion for you. PLEASE NOTE THAT THE VALIDATION TEXT WILL BE REMOVED WHEN YOU FINALIZE YOUR NOTE. IF YOU WANT TO FAX A PRELIMINARY NOTE YOU WILL NEED TO TOGGLE THIS TO 'NO' IF YOU DO NOT WANT IT IN YOUR FAXED NOTE.

## 2020-08-04 NOTE — HPI: MOHS SURGERY CONSULTATION
Has The Cancer Been Biopsied Before?: has been previously biopsied
Who Is Your Referring Provider?: Ev Oreilly NP
Year Removed: 1900

## 2020-08-04 NOTE — PROCEDURE: REPAIR NOTE
Referring Provider (Optional): Mrs. Ev Oreilly, NP
Anesthesia Volume In Cc: 12
Did You Provide Opioid Counseling: No
Repair Type: Intermediate Layered Repair
Suturegard Retention Suture: 2-0 Nylon
Retention Suture Bite Size: 3 mm
Length To Time In Minutes Device Was In Place: 10
Number Of Hemigard Strips Per Side: 1
Simple / Intermediate / Complex Repair - Final Wound Length In Cm: 8.1
Undermining Type: Entire Wound
Debridement Text: The wound edges were debrided prior to proceeding with the closure to facilitate wound healing.
Helical Rim Text: The closure involved the helical rim.
Vermilion Border Text: The closure involved the vermilion border.
Nostril Rim Text: The closure involved the nostril rim.
Retention Suture Text: Retention sutures were placed to support the closure and prevent dehiscence.
Primary Defect Length (In Cm): 0
Skin Substitute: EpiFix Micronized
Suture Removal: 14 days
Type Of Previous Surgery (Optional- Ie Mohs Surgery): Mohs Surgery
Date Of Previous Surgery (Optional): 8/4/2020
Include The Following Details In The Note (If No Details Will Only Be Reflected In The Surgical Fax): Yes
Pre-Op Size Of Lesion Removed (Optional): 3.3
Detail Level: Detailed
Anesthesia Type: 1% lidocaine with 1:100,000 epinephrine and a 1:10 solution of 8.4% sodium bicarbonate
Additional Anesthesia Volume In Cc: 6
Hemostasis: Electrocautery
Estimated Blood Loss (Cc): minimal
Deep Sutures: 4-0 PDS
Epidermal Sutures: 6-0 Prolene
Epidermal Closure: running
Wound Care: Vaseline
Dressing: pressure dressing with telfa
Unna Boot Text: An Unna boot was placed to help immobilize the limb and facilitate more rapid healing.
Suturegard Intro: Intraoperative tissue expansion was performed, utilizing the SUTUREGARD device, in order to reduce wound tension.
Suturegard Body: The suture ends were repeatedly re-tightened and re-clamped to achieve the desired tissue expansion.
Hemigard Intro: Due to skin fragility and wound tension, it was decided to use HEMIGARD adhesive retention suture devices to permit a linear closure. The skin was cleaned and dried for a 6cm distance away from the wound. Excessive hair, if present, was removed to allow for adhesion.
Hemigard Postcare Instructions: The HEMIGARD strips are to remain completely dry for at least 5-7 days.
Graft Basting Suture (Optional): 5-0 Fast Absorbing Gut
Graft Donor Site Epidermal Sutures (Optional): 5-0 Plain Gut
Closure 2 Information: This tab is for additional flaps and grafts, including complex repair and grafts and complex repair and flaps. You can also specify a different location for the additional defect, if the location is the same you do not need to select a new one. We will insert the automated text for the repair you select below just as we do for solitary flaps and grafts. Please note that at this time if you select a location with a different insurance zone you will need to override the ICD10 and CPT if appropriate.
Closure 3 Information: This tab is for additional flaps and grafts above and beyond our usual structured repairs.  Please note if you enter information here it will not currently bill and you will need to add the billing information manually.
Closure 4 Information: This tab is for additional flaps and grafts above and beyond our usual structured repairs.  Please note if you enter information here it will not currently bill and you will need to add the billing information manually.
Complex Repair Preamble Text (Leave Blank If You Do Not Want): The area was prepped with antiseptic solution, draped in a sterile fashion, and the planned surgical repair was demarcated using gentian violet incorporating the defect and proposed Burow’s triangles, placing the expected incisions within the relaxed skin tension lines where possible. The surgery site was infiltrated with local anesthesia. The proposed repair and Burow’s triangles were incised to the depth of the subcutaneous fat and/or superficial fascia, and wound edges were undermined broadly as needed to reduce tension at the incision. The defect edges were debeveled as needed. Hemostasis was achieved with electrocoagulation. The superficial fascial, subcutaneous, and dermal wound edges were approximated with buried vertical mattress sutures as noted below.
Intermediate Repair Preamble Text (Leave Blank If You Do Not Want): Undermining was performed with blunt dissection.
Crescentic Complex Repair Preamble Text (Leave Blank If You Do Not Want): Extensive wide undermining was performed.
Non-Graft Cartilage Fenestration Text: The cartilage was fenestrated with a 2mm punch biopsy to help facilitate healing.
Graft Cartilage Fenestration Text: The cartilage was fenestrated with a 2mm punch biopsy to help facilitate graft survival and healing.
Preparation Of Recipient Site - Flap: The eschar was removed surgically with sharp dissection to facilitate appropriate wound healing of the following adjacent tissue rearrangement.
Preparation Of Recipient Site - Graft: The eschar was removed surgically with sharp dissection to facilitate appropriate survival of the following graft.
Preparation Of Recipient Site - Flap Takedown: The eschar and granulation tissue was removed surgically with sharp dissection to facilitate appropriate healing after division and inset of the proximal and distal interpolation flap.
Secondary Intention Text (Leave Blank If You Do Not Want): The defect will heal with secondary intention.
No Repair - Repaired With Adjacent Surgical Defect Text (Leave Blank If You Do Not Want): After obtaining clear surgical margins the defect was repaired concurrently with another surgical defect which was in close approximation.
Referred To Oculoplastics For Closure Text (Leave Blank If You Do Not Want): After obtaining clear surgical margins the patient was sent to oculoplastics for surgical repair.  The patient understands they will receive post-surgical care and follow-up from the referring physician's office.
Referred To Otolaryngology For Closure Text (Leave Blank If You Do Not Want): After obtaining clear surgical margins the patient was sent to otolaryngology for surgical repair.  The patient understands they will receive post-surgical care and follow-up from the referring physician's office.
Referred To Plastics For Closure Text (Leave Blank If You Do Not Want): After obtaining clear surgical margins the patient was sent to plastics for surgical repair.  The patient understands they will receive post-surgical care and follow-up from the referring physician's office.
Referred To Asc For Closure Text (Leave Blank If You Do Not Want): After obtaining clear surgical margins the patient was sent to an ASC for surgical repair.  The patient understands they will receive post-surgical care and follow-up from the ASC physician.
Referred To Mid-Level For Closure Text (Leave Blank If You Do Not Want): After obtaining clear surgical margins the patient was sent to a mid-level provider for surgical repair.  The patient understands they will receive post-surgical care and follow-up from the mid-level provider.
Repair Performed By Another Provider Text (Leave Blank If You Do Not Want): After obtaining clear surgical margins the defect was repaired by another provider.
Advancement Flap (Single) Text: The defect edges were debeveled with a #15 scalpel blade.  Given the location of the defect and the proximity to free margins a single advancement flap was deemed most appropriate.  Using a sterile surgical marker, an appropriate advancement flap was drawn incorporating the defect and placing the expected incisions within the relaxed skin tension lines where possible.    The area thus outlined was incised deep to adipose tissue with a #15 scalpel blade.  The skin margins were undermined to an appropriate distance in all directions utilizing iris scissors.
Advancement Flap (Double) Text: The defect edges were debeveled with a #15 scalpel blade.  Given the location of the defect and the proximity to free margins a double advancement flap was deemed most appropriate.  Using a sterile surgical marker, the appropriate advancement flaps were drawn incorporating the defect and placing the expected incisions within the relaxed skin tension lines where possible.    The area thus outlined was incised deep to adipose tissue with a #15 scalpel blade.  The skin margins were undermined to an appropriate distance in all directions utilizing iris scissors.
Burow's Advancement Flap Text: The defect edges were debeveled with a #15 scalpel blade.  Given the location of the defect and the proximity to free margins a Burow's advancement flap was deemed most appropriate.  Using a sterile surgical marker, the appropriate advancement flap was drawn incorporating the defect and placing the expected incisions within the relaxed skin tension lines where possible.    The area thus outlined was incised deep to adipose tissue with a #15 scalpel blade.  The skin margins were undermined to an appropriate distance in all directions utilizing iris scissors.
Chonodrocutaneous Helical Advancement Flap Text: The defect edges were debeveled with a #15 scalpel blade.  Given the location of the defect and the proximity to free margins a chondrocutaneous helical advancement flap was deemed most appropriate.  Using a sterile surgical marker, the appropriate advancement flap was drawn incorporating the defect and placing the expected incisions within the relaxed skin tension lines where possible.    The area thus outlined was incised deep to adipose tissue with a #15 scalpel blade.  The skin margins were undermined to an appropriate distance in all directions utilizing iris scissors.
Crescentic Advancement Flap Text: The defect edges were debeveled with a #15 scalpel blade.  Given the location of the defect and the proximity to free margins a crescentic advancement flap was deemed most appropriate.  Using a sterile surgical marker, the appropriate advancement flap was drawn incorporating the defect and placing the expected incisions within the relaxed skin tension lines where possible.    The area thus outlined was incised deep to adipose tissue with a #15 scalpel blade.  The skin margins were undermined to an appropriate distance in all directions utilizing iris scissors.
A-T Advancement Flap Text: The defect edges were debeveled with a #15 scalpel blade.  Given the location of the defect, shape of the defect and the proximity to free margins an A-T advancement flap was deemed most appropriate.  Using a sterile surgical marker, an appropriate advancement flap was drawn incorporating the defect and placing the expected incisions within the relaxed skin tension lines where possible.    The area thus outlined was incised deep to adipose tissue with a #15 scalpel blade.  The skin margins were undermined to an appropriate distance in all directions utilizing iris scissors.
O-T Advancement Flap Text: The defect edges were debeveled with a #15 scalpel blade.  Given the location of the defect, shape of the defect and the proximity to free margins an O-T advancement flap was deemed most appropriate.  Using a sterile surgical marker, an appropriate advancement flap was drawn incorporating the defect and placing the expected incisions within the relaxed skin tension lines where possible.    The area thus outlined was incised deep to adipose tissue with a #15 scalpel blade.  The skin margins were undermined to an appropriate distance in all directions utilizing iris scissors.
O-L Flap Text: The defect edges were debeveled with a #15 scalpel blade.  Given the location of the defect, shape of the defect and the proximity to free margins an O-L flap was deemed most appropriate.  Using a sterile surgical marker, an appropriate advancement flap was drawn incorporating the defect and placing the expected incisions within the relaxed skin tension lines where possible.    The area thus outlined was incised deep to adipose tissue with a #15 scalpel blade.  The skin margins were undermined to an appropriate distance in all directions utilizing iris scissors.
O-Z Flap Text: The defect edges were debeveled with a #15 scalpel blade.  Given the location of the defect, shape of the defect and the proximity to free margins an O-Z flap was deemed most appropriate.  Using a sterile surgical marker, an appropriate transposition flap was drawn incorporating the defect and placing the expected incisions within the relaxed skin tension lines where possible. The area thus outlined was incised deep to adipose tissue with a #15 scalpel blade.  The skin margins were undermined to an appropriate distance in all directions utilizing iris scissors.
Double O-Z Flap Text: The defect edges were debeveled with a #15 scalpel blade.  Given the location of the defect, shape of the defect and the proximity to free margins a Double O-Z flap was deemed most appropriate.  Using a sterile surgical marker, an appropriate transposition flap was drawn incorporating the defect and placing the expected incisions within the relaxed skin tension lines where possible. The area thus outlined was incised deep to adipose tissue with a #15 scalpel blade.  The skin margins were undermined to an appropriate distance in all directions utilizing iris scissors.
V-Y Flap Text: The defect edges were debeveled with a #15 scalpel blade.  Given the location of the defect, shape of the defect and the proximity to free margins a V-Y flap was deemed most appropriate.  Using a sterile surgical marker, an appropriate advancement flap was drawn incorporating the defect and placing the expected incisions within the relaxed skin tension lines where possible.    The area thus outlined was incised deep to adipose tissue with a #15 scalpel blade.  The skin margins were undermined to an appropriate distance in all directions utilizing iris scissors.
Advancement-Rotation Flap Text: The defect edges were debeveled with a #15 scalpel blade.  Given the location of the defect, shape of the defect and the proximity to free margins an advancement-rotation flap was deemed most appropriate.  Using a sterile surgical marker, an appropriate advancement flap was drawn incorporating the defect and placing the expected incisions within the relaxed skin tension lines where possible.    The area thus outlined was incised deep to adipose tissue with a #15 scalpel blade.  The skin margins were undermined to an appropriate distance in all directions utilizing iris scissors.
Mercedes Flap Text: The defect edges were debeveled with a #15 scalpel blade.  Given the location of the defect, shape of the defect and the proximity to free margins a Mercedes flap was deemed most appropriate.  Using a sterile surgical marker, an appropriate advancement flap was drawn incorporating the defect and placing the expected incisions within the relaxed skin tension lines where possible. The area thus outlined was incised deep to adipose tissue with a #15 scalpel blade.  The skin margins were undermined to an appropriate distance in all directions utilizing iris scissors.
Modified Advancement Flap Text: The defect edges were debeveled with a #15 scalpel blade.  Given the location of the defect, shape of the defect and the proximity to free margins a modified advancement flap was deemed most appropriate.  Using a sterile surgical marker, an appropriate advancement flap was drawn incorporating the defect and placing the expected incisions within the relaxed skin tension lines where possible.    The area thus outlined was incised deep to adipose tissue with a #15 scalpel blade.  The skin margins were undermined to an appropriate distance in all directions utilizing iris scissors.
Mucosal Advancement Flap Text: Given the location of the defect, shape of the defect and the proximity to free margins a mucosal advancement flap was deemed most appropriate. Incisions were made with a 15 blade scalpel in the appropriate fashion along the cutaneous vermillion border and the mucosal lip. The remaining actinically damaged mucosal tissue was excised.  The mucosal advancement flap was then elevated to the gingival sulcus with care taken to preserve the neurovascular structures and advanced into the primary defect. Care was taken to ensure that precise realignment of the vermillion border was achieved.
Peng Advancement Flap Text: The defect edges were debeveled with a #15 scalpel blade.  Given the location of the defect, shape of the defect and the proximity to free margins a Peng advancement flap was deemed most appropriate.  Using a sterile surgical marker, an appropriate advancement flap was drawn incorporating the defect and placing the expected incisions within the relaxed skin tension lines where possible. The area thus outlined was incised deep to adipose tissue with a #15 scalpel blade.  The skin margins were undermined to an appropriate distance in all directions utilizing iris scissors.
Hatchet Flap Text: The defect edges were debeveled with a #15 scalpel blade.  Given the location of the defect, shape of the defect and the proximity to free margins a hatchet flap was deemed most appropriate.  Using a sterile surgical marker, an appropriate hatchet flap was drawn incorporating the defect and placing the expected incisions within the relaxed skin tension lines where possible.    The area thus outlined was incised deep to adipose tissue with a #15 scalpel blade.  The skin margins were undermined to an appropriate distance in all directions utilizing iris scissors.
Rotation Flap Text: The defect edges were debeveled with a #15 scalpel blade.  Given the location of the defect, shape of the defect and the proximity to free margins a rotation flap was deemed most appropriate.  Using a sterile surgical marker, an appropriate rotation flap was drawn incorporating the defect and placing the expected incisions within the relaxed skin tension lines where possible.    The area thus outlined was incised deep to adipose tissue with a #15 scalpel blade.  The skin margins were undermined to an appropriate distance in all directions utilizing iris scissors.
Spiral Flap Text: The defect edges were debeveled with a #15 scalpel blade.  Given the location of the defect, shape of the defect and the proximity to free margins a spiral flap was deemed most appropriate.  Using a sterile surgical marker, an appropriate rotation flap was drawn incorporating the defect and placing the expected incisions within the relaxed skin tension lines where possible. The area thus outlined was incised deep to adipose tissue with a #15 scalpel blade.  The skin margins were undermined to an appropriate distance in all directions utilizing iris scissors.
Star Wedge Flap Text: The defect edges were debeveled with a #15 scalpel blade.  Given the location of the defect, shape of the defect and the proximity to free margins a star wedge flap was deemed most appropriate.  Using a sterile surgical marker, an appropriate rotation flap was drawn incorporating the defect and placing the expected incisions within the relaxed skin tension lines where possible. The area thus outlined was incised deep to adipose tissue with a #15 scalpel blade.  The skin margins were undermined to an appropriate distance in all directions utilizing iris scissors.
Transposition Flap Text: The defect edges were debeveled with a #15 scalpel blade.  Given the location of the defect and the proximity to free margins a transposition flap was deemed most appropriate.  Using a sterile surgical marker, an appropriate transposition flap was drawn incorporating the defect.    The area thus outlined was incised deep to adipose tissue with a #15 scalpel blade.  The skin margins were undermined to an appropriate distance in all directions utilizing iris scissors.
Muscle Hinge Flap Text: The defect edges were debeveled with a #15 scalpel blade.  Given the size, depth and location of the defect and the proximity to free margins a muscle hinge flap was deemed most appropriate.  Using a sterile surgical marker, an appropriate hinge flap was drawn incorporating the defect. The area thus outlined was incised with a #15 scalpel blade.  The skin margins were undermined to an appropriate distance in all directions utilizing iris scissors.
Melolabial Transposition Flap Text: The defect edges were debeveled with a #15 scalpel blade.  Given the location of the defect and the proximity to free margins a melolabial flap was deemed most appropriate.  Using a sterile surgical marker, an appropriate melolabial transposition flap was drawn incorporating the defect.    The area thus outlined was incised deep to adipose tissue with a #15 scalpel blade.  The skin margins were undermined to an appropriate distance in all directions utilizing iris scissors.
Rhombic Flap Text: The defect edges were debeveled with a #15 scalpel blade.  Given the location of the defect and the proximity to free margins a rhombic flap was deemed most appropriate.  Using a sterile surgical marker, an appropriate rhombic flap was drawn incorporating the defect.    The area thus outlined was incised deep to adipose tissue with a #15 scalpel blade.  The skin margins were undermined to an appropriate distance in all directions utilizing iris scissors.
Rhomboid Transposition Flap Text: The defect edges were debeveled with a #15 scalpel blade.  Given the location of the defect and the proximity to free margins a rhomboid transposition flap was deemed most appropriate.  Using a sterile surgical marker, an appropriate rhomboid flap was drawn incorporating the defect.    The area thus outlined was incised deep to adipose tissue with a #15 scalpel blade.  The skin margins were undermined to an appropriate distance in all directions utilizing iris scissors.
Bi-Rhombic Flap Text: The defect edges were debeveled with a #15 scalpel blade.  Given the location of the defect and the proximity to free margins a bi-rhombic flap was deemed most appropriate.  Using a sterile surgical marker, an appropriate rhombic flap was drawn incorporating the defect. The area thus outlined was incised deep to adipose tissue with a #15 scalpel blade.  The skin margins were undermined to an appropriate distance in all directions utilizing iris scissors.
Helical Rim Advancement Flap Text: The defect edges were debeveled with a #15 blade scalpel.  Given the location of the defect and the proximity to free margins (helical rim) a double helical rim advancement flap was deemed most appropriate.  Using a sterile surgical marker, the appropriate advancement flaps were drawn incorporating the defect and placing the expected incisions between the helical rim and antihelix where possible.  The area thus outlined was incised through and through with a #15 scalpel blade.  With a skin hook and iris scissors, the flaps were gently and sharply undermined and freed up.
Bilateral Helical Rim Advancement Flap Text: The defect edges were debeveled with a #15 blade scalpel.  Given the location of the defect and the proximity to free margins (helical rim) a bilateral helical rim advancement flap was deemed most appropriate.  Using a sterile surgical marker, the appropriate advancement flaps were drawn incorporating the defect and placing the expected incisions between the helical rim and antihelix where possible.  The area thus outlined was incised through and through with a #15 scalpel blade.  With a skin hook and iris scissors, the flaps were gently and sharply undermined and freed up.
Ear Star Wedge Flap Text: The defect edges were debeveled with a #11 blade scalpel.  Given the location of the defect and the proximity to free margins (helical rim) an ear star wedge flap was deemed most appropriate.  Using a sterile surgical marker, the appropriate flap was drawn incorporating the defect and placing the expected incisions between the helical rim and antihelix where possible.  The area thus outlined was incised through and through with a #11 scalpel blade.
Banner Transposition Flap Text: The defect edges were debeveled with a #15 scalpel blade.  Given the location of the defect and the proximity to free margins a Banner transposition flap was deemed most appropriate.  Using a sterile surgical marker, an appropriate flap drawn around the defect. The area thus outlined was incised deep to adipose tissue with a #15 scalpel blade.  The skin margins were undermined to an appropriate distance in all directions utilizing iris scissors.
Bilobed Flap Text: The defect edges were debeveled with a #15 scalpel blade.  Given the location of the defect and the proximity to free margins a bilobe flap was deemed most appropriate.  Using a sterile surgical marker, an appropriate bilobe flap drawn around the defect.    The area thus outlined was incised deep to adipose tissue with a #15 scalpel blade.  The skin margins were undermined to an appropriate distance in all directions utilizing iris scissors.
Bilobed Transposition Flap Text: The defect edges were debeveled with a #15 scalpel blade.  Given the location of the defect and the proximity to free margins a bilobed transposition flap was deemed most appropriate.  Using a sterile surgical marker, an appropriate bilobe flap drawn around the defect.    The area thus outlined was incised deep to adipose tissue with a #15 scalpel blade.  The skin margins were undermined to an appropriate distance in all directions utilizing iris scissors.
Trilobed Flap Text: The defect edges were debeveled with a #15 scalpel blade.  Given the location of the defect and the proximity to free margins a trilobed flap was deemed most appropriate.  Using a sterile surgical marker, an appropriate trilobed flap drawn around the defect.    The area thus outlined was incised deep to adipose tissue with a #15 scalpel blade.  The skin margins were undermined to an appropriate distance in all directions utilizing iris scissors.
Dorsal Nasal Flap Text: The defect edges were debeveled with a #15 scalpel blade.  Given the location of the defect and the proximity to free margins a dorsal nasal flap was deemed most appropriate.  Using a sterile surgical marker, an appropriate dorsal nasal flap was drawn around the defect.    The area thus outlined was incised deep to adipose tissue with a #15 scalpel blade.  The skin margins were undermined to an appropriate distance in all directions utilizing iris scissors.
Island Pedicle Flap Text: The defect edges were debeveled with a #15 scalpel blade.  Given the location of the defect, shape of the defect and the proximity to free margins an island pedicle advancement flap was deemed most appropriate.  Using a sterile surgical marker, an appropriate advancement flap was drawn incorporating the defect, outlining the appropriate donor tissue and placing the expected incisions within the relaxed skin tension lines where possible.    The area thus outlined was incised deep to adipose tissue with a #15 scalpel blade.  The skin margins were undermined to an appropriate distance in all directions around the primary defect and laterally outward around the island pedicle utilizing iris scissors.  There was minimal undermining beneath the pedicle flap.
Island Pedicle Flap With Canthal Suspension Text: The defect edges were debeveled with a #15 scalpel blade.  Given the location of the defect, shape of the defect and the proximity to free margins an island pedicle advancement flap was deemed most appropriate.  Using a sterile surgical marker, an appropriate advancement flap was drawn incorporating the defect, outlining the appropriate donor tissue and placing the expected incisions within the relaxed skin tension lines where possible. The area thus outlined was incised deep to adipose tissue with a #15 scalpel blade.  The skin margins were undermined to an appropriate distance in all directions around the primary defect and laterally outward around the island pedicle utilizing iris scissors.  There was minimal undermining beneath the pedicle flap. A suspension suture was placed in the canthal tendon to prevent tension and prevent ectropion.
Alar Island Pedicle Flap Text: The defect edges were debeveled with a #15 scalpel blade.  Given the location of the defect, shape of the defect and the proximity to the alar rim an island pedicle advancement flap was deemed most appropriate.  Using a sterile surgical marker, an appropriate advancement flap was drawn incorporating the defect, outlining the appropriate donor tissue and placing the expected incisions within the nasal ala running parallel to the alar rim. The area thus outlined was incised with a #15 scalpel blade.  The skin margins were undermined minimally to an appropriate distance in all directions around the primary defect and laterally outward around the island pedicle utilizing iris scissors.  There was minimal undermining beneath the pedicle flap.
Double Island Pedicle Flap Text: The defect edges were debeveled with a #15 scalpel blade.  Given the location of the defect, shape of the defect and the proximity to free margins a double island pedicle advancement flap was deemed most appropriate.  Using a sterile surgical marker, an appropriate advancement flap was drawn incorporating the defect, outlining the appropriate donor tissue and placing the expected incisions within the relaxed skin tension lines where possible.    The area thus outlined was incised deep to adipose tissue with a #15 scalpel blade.  The skin margins were undermined to an appropriate distance in all directions around the primary defect and laterally outward around the island pedicle utilizing iris scissors.  There was minimal undermining beneath the pedicle flap.
Island Pedicle Flap-Requiring Vessel Identification Text: The defect edges were debeveled with a #15 scalpel blade.  Given the location of the defect, shape of the defect and the proximity to free margins an island pedicle advancement flap was deemed most appropriate.  Using a sterile surgical marker, an appropriate advancement flap was drawn, based on the axial vessel mentioned above, incorporating the defect, outlining the appropriate donor tissue and placing the expected incisions within the relaxed skin tension lines where possible.    The area thus outlined was incised deep to adipose tissue with a #15 scalpel blade.  The skin margins were undermined to an appropriate distance in all directions around the primary defect and laterally outward around the island pedicle utilizing iris scissors.  There was minimal undermining beneath the pedicle flap.
Keystone Flap Text: The defect edges were debeveled with a #15 scalpel blade.  Given the location of the defect, shape of the defect a keystone flap was deemed most appropriate.  Using a sterile surgical marker, an appropriate keystone flap was drawn incorporating the defect, outlining the appropriate donor tissue and placing the expected incisions within the relaxed skin tension lines where possible. The area thus outlined was incised deep to adipose tissue with a #15 scalpel blade.  The skin margins were undermined to an appropriate distance in all directions around the primary defect and laterally outward around the flap utilizing iris scissors.
O-T Plasty Text: The defect edges were debeveled with a #15 scalpel blade.  Given the location of the defect, shape of the defect and the proximity to free margins an O-T plasty was deemed most appropriate.  Using a sterile surgical marker, an appropriate O-T plasty was drawn incorporating the defect and placing the expected incisions within the relaxed skin tension lines where possible.    The area thus outlined was incised deep to adipose tissue with a #15 scalpel blade.  The skin margins were undermined to an appropriate distance in all directions utilizing iris scissors.
O-Z Plasty Text: The defect edges were debeveled with a #15 scalpel blade.  Given the location of the defect, shape of the defect and the proximity to free margins an O-Z plasty (double transposition flap) was deemed most appropriate.  Using a sterile surgical marker, the appropriate transposition flaps were drawn incorporating the defect and placing the expected incisions within the relaxed skin tension lines where possible.    The area thus outlined was incised deep to adipose tissue with a #15 scalpel blade.  The skin margins were undermined to an appropriate distance in all directions utilizing iris scissors.  Hemostasis was achieved with electrocautery.  The flaps were then transposed into place, one clockwise and the other counterclockwise, and anchored with interrupted buried subcutaneous sutures.
Double O-Z Plasty Text: The defect edges were debeveled with a #15 scalpel blade.  Given the location of the defect, shape of the defect and the proximity to free margins a Double O-Z plasty (double transposition flap) was deemed most appropriate.  Using a sterile surgical marker, the appropriate transposition flaps were drawn incorporating the defect and placing the expected incisions within the relaxed skin tension lines where possible. The area thus outlined was incised deep to adipose tissue with a #15 scalpel blade.  The skin margins were undermined to an appropriate distance in all directions utilizing iris scissors.  Hemostasis was achieved with electrocautery.  The flaps were then transposed into place, one clockwise and the other counterclockwise, and anchored with interrupted buried subcutaneous sutures.
V-Y Plasty Text: The defect edges were debeveled with a #15 scalpel blade.  Given the location of the defect, shape of the defect and the proximity to free margins an V-Y advancement flap was deemed most appropriate.  Using a sterile surgical marker, an appropriate advancement flap was drawn incorporating the defect and placing the expected incisions within the relaxed skin tension lines where possible.    The area thus outlined was incised deep to adipose tissue with a #15 scalpel blade.  The skin margins were undermined to an appropriate distance in all directions utilizing iris scissors.
H Plasty Text: Given the location of the defect, shape of the defect and the proximity to free margins a H-plasty was deemed most appropriate for repair.  Using a sterile surgical marker, the appropriate advancement arms of the H-plasty were drawn incorporating the defect and placing the expected incisions within the relaxed skin tension lines where possible. The area thus outlined was incised deep to adipose tissue with a #15 scalpel blade. The skin margins were undermined to an appropriate distance in all directions utilizing iris scissors.  The opposing advancement arms were then advanced into place in opposite direction and anchored with interrupted buried subcutaneous sutures.
W Plasty Text: The lesion was extirpated to the level of the fat with a #15 scalpel blade.  Given the location of the defect, shape of the defect and the proximity to free margins a W-plasty was deemed most appropriate for repair.  Using a sterile surgical marker, the appropriate transposition arms of the W-plasty were drawn incorporating the defect and placing the expected incisions within the relaxed skin tension lines where possible.    The area thus outlined was incised deep to adipose tissue with a #15 scalpel blade.  The skin margins were undermined to an appropriate distance in all directions utilizing iris scissors.  The opposing transposition arms were then transposed into place in opposite direction and anchored with interrupted buried subcutaneous sutures.
Z Plasty Text: The lesion was extirpated to the level of the fat with a #15 scalpel blade.  Given the location of the defect, shape of the defect and the proximity to free margins a Z-plasty was deemed most appropriate for repair.  Using a sterile surgical marker, the appropriate transposition arms of the Z-plasty were drawn incorporating the defect and placing the expected incisions within the relaxed skin tension lines where possible.    The area thus outlined was incised deep to adipose tissue with a #15 scalpel blade.  The skin margins were undermined to an appropriate distance in all directions utilizing iris scissors.  The opposing transposition arms were then transposed into place in opposite direction and anchored with interrupted buried subcutaneous sutures.
Zygomaticofacial Flap Text: Given the location of the defect, shape of the defect and the proximity to free margins a zygomaticofacial flap was deemed most appropriate for repair.  Using a sterile surgical marker, the appropriate flap was drawn incorporating the defect and placing the expected incisions within the relaxed skin tension lines where possible. The area thus outlined was incised deep to adipose tissue with a #15 scalpel blade with preservation of a vascular pedicle.  The skin margins were undermined to an appropriate distance in all directions utilizing iris scissors.  The flap was then placed into the defect and anchored with interrupted buried subcutaneous sutures.
Cheek Interpolation Flap Text: A decision was made to reconstruct the defect utilizing an interpolation axial flap and a staged reconstruction.  A telfa template was made of the defect.  This telfa template was then used to outline the Cheek Interpolation flap.  The donor area for the pedicle flap was then injected with anesthesia.  The flap was excised through the skin and subcutaneous tissue down to the layer of the underlying musculature.  The interpolation flap was carefully excised within this deep plane to maintain its blood supply.  The edges of the donor site were undermined.   The donor site was closed in a primary fashion.  The pedicle was then rotated into position and sutured.  Once the tube was sutured into place, adequate blood supply was confirmed with blanching and refill.  The pedicle was then wrapped with xeroform gauze and dressed appropriately with a telfa and gauze bandage to ensure continued blood supply and protect the attached pedicle.
Cheek-To-Nose Interpolation Flap Text: A decision was made to reconstruct the defect utilizing an interpolation axial flap and a staged reconstruction.  A telfa template was made of the defect.  This telfa template was then used to outline the Cheek-To-Nose Interpolation flap.  The donor area for the pedicle flap was then injected with anesthesia.  The flap was excised through the skin and subcutaneous tissue down to the layer of the underlying musculature.  The interpolation flap was carefully excised within this deep plane to maintain its blood supply.  The edges of the donor site were undermined.   The donor site was closed in a primary fashion.  The pedicle was then rotated into position and sutured.  Once the tube was sutured into place, adequate blood supply was confirmed with blanching and refill.  The pedicle was then wrapped with xeroform gauze and dressed appropriately with a telfa and gauze bandage to ensure continued blood supply and protect the attached pedicle.
Interpolation Flap Text: A decision was made to reconstruct the defect utilizing an interpolation axial flap and a staged reconstruction.  A telfa template was made of the defect.  This telfa template was then used to outline the interpolation flap.  The donor area for the pedicle flap was then injected with anesthesia.  The flap was excised through the skin and subcutaneous tissue down to the layer of the underlying musculature.  The interpolation flap was carefully excised within this deep plane to maintain its blood supply.  The edges of the donor site were undermined.   The donor site was closed in a primary fashion.  The pedicle was then rotated into position and sutured.  Once the tube was sutured into place, adequate blood supply was confirmed with blanching and refill.  The pedicle was then wrapped with xeroform gauze and dressed appropriately with a telfa and gauze bandage to ensure continued blood supply and protect the attached pedicle.
Melolabial Interpolation Flap Text: A decision was made to reconstruct the defect utilizing an interpolation axial flap and a staged reconstruction.  A telfa template was made of the defect.  This telfa template was then used to outline the melolabial interpolation flap.  The donor area for the pedicle flap was then injected with anesthesia.  The flap was excised through the skin and subcutaneous tissue down to the layer of the underlying musculature.  The pedicle flap was carefully excised within this deep plane to maintain its blood supply.  The edges of the donor site were undermined.   The donor site was closed in a primary fashion.  The pedicle was then rotated into position and sutured.  Once the tube was sutured into place, adequate blood supply was confirmed with blanching and refill.  The pedicle was then wrapped with xeroform gauze and dressed appropriately with a telfa and gauze bandage to ensure continued blood supply and protect the attached pedicle.
Mastoid Interpolation Flap Text: A decision was made to reconstruct the defect utilizing an interpolation axial flap and a staged reconstruction.  A telfa template was made of the defect.  This telfa template was then used to outline the mastoid interpolation flap.  The donor area for the pedicle flap was then injected with anesthesia.  The flap was excised through the skin and subcutaneous tissue down to the layer of the underlying musculature.  The pedicle flap was carefully excised within this deep plane to maintain its blood supply.  The edges of the donor site were undermined.   The donor site was closed in a primary fashion.  The pedicle was then rotated into position and sutured.  Once the tube was sutured into place, adequate blood supply was confirmed with blanching and refill.  The pedicle was then wrapped with xeroform gauze and dressed appropriately with a telfa and gauze bandage to ensure continued blood supply and protect the attached pedicle.
Posterior Auricular Interpolation Flap Text: A decision was made to reconstruct the defect utilizing an interpolation axial flap and a staged reconstruction.  A telfa template was made of the defect.  This telfa template was then used to outline the posterior auricular interpolation flap.  The donor area for the pedicle flap was then injected with anesthesia.  The flap was excised through the skin and subcutaneous tissue down to the layer of the underlying musculature.  The pedicle flap was carefully excised within this deep plane to maintain its blood supply.  The edges of the donor site were undermined.   The donor site was closed in a primary fashion.  The pedicle was then rotated into position and sutured.  Once the tube was sutured into place, adequate blood supply was confirmed with blanching and refill.  The pedicle was then wrapped with xeroform gauze and dressed appropriately with a telfa and gauze bandage to ensure continued blood supply and protect the attached pedicle.
Paramedian Forehead Flap Text: A decision was made to reconstruct the defect utilizing an interpolation axial flap and a staged reconstruction.  A telfa template was made of the defect.  This telfa template was then used to outline the paramedian forehead pedicle flap.  The donor area for the pedicle flap was then injected with anesthesia.  The flap was excised through the skin and subcutaneous tissue down to the layer of the underlying musculature.  The pedicle flap was carefully excised within this deep plane to maintain its blood supply.  The edges of the donor site were undermined.   The donor site was closed in a primary fashion.  The pedicle was then rotated into position and sutured.  Once the tube was sutured into place, adequate blood supply was confirmed with blanching and refill.  The pedicle was then wrapped with xeroform gauze and dressed appropriately with a telfa and gauze bandage to ensure continued blood supply and protect the attached pedicle.
Cheiloplasty (Less Than 50%) Text: A decision was made to reconstruct the defect with a  cheiloplasty.  The defect was undermined extensively.  Additional obicularis oris muscle was excised with a 15 blade scalpel.  The defect was converted into a full thickness wedge, of less than 50% of the vertical height of the lip, to facilite a better cosmetic result.  Small vessels were then tied off with 5-0 monocyrl. The obicularis oris, superficial fascia, adipose and dermis were then reapproximated.  After the deeper layers were approximated the epidermis was reapproximated with particular care given to realign the vermillion border.
Cheiloplasty (Complex) Text: A decision was made to reconstruct the defect with a  cheiloplasty.  The defect was undermined extensively.  Additional obicularis oris muscle was excised with a 15 blade scalpel.  The defect was converted into a full thickness wedge to facilite a better cosmetic result.  Small vessels were then tied off with 5-0 monocyrl. The obicularis oris, superficial fascia, adipose and dermis were then reapproximated.  After the deeper layers were approximated the epidermis was reapproximated with particular care given to realign the vermillion border.
Ear Wedge Repair Text: A wedge excision was completed by carrying down an excision through the full thickness of the ear and cartilage with an inward facing Burow's triangle. The wound was then closed in a layered fashion.
Full Thickness Lip Wedge Repair (Flap) Text: Given the location of the defect and the proximity to free margins a full thickness wedge repair was deemed most appropriate.  Using a sterile surgical marker, the appropriate repair was drawn incorporating the defect and placing the expected incisions perpendicular to the vermilion border.  The vermilion border was also meticulously outlined to ensure appropriate reapproximation during the repair.  The area thus outlined was incised through and through with a #15 scalpel blade.  The muscularis and dermis were reaproximated with deep sutures following hemostasis. Care was taken to realign the vermilion border before proceeding with the superficial closure.  Once the vermilion was realigned the superfical and mucosal closure was finished.
Ftsg Text: The defect edges were debeveled with a #15 scalpel blade.  Given the location of the defect, shape of the defect and the proximity to free margins a full thickness skin graft was deemed most appropriate.  Using a sterile surgical marker, the primary defect shape was transferred to the donor site. The area thus outlined was incised deep to adipose tissue with a #15 scalpel blade.  The harvested graft was then trimmed of adipose tissue until only dermis and epidermis was left.  The skin margins of the secondary defect were undermined to an appropriate distance in all directions utilizing iris scissors.  The secondary defect was closed with interrupted buried subcutaneous sutures.  The skin edges were then re-apposed with running  sutures.  The skin graft was then placed in the primary defect and oriented appropriately.
Split-Thickness Skin Graft Text: The defect edges were debeveled with a #15 scalpel blade.  Given the location of the defect, shape of the defect and the proximity to free margins a split thickness skin graft was deemed most appropriate.  Using a sterile surgical marker, the primary defect shape was transferred to the donor site. The split thickness graft was then harvested.  The skin graft was then placed in the primary defect and oriented appropriately.
Burow's Graft Text: The defect edges were debeveled with a #15 scalpel blade.  Given the location of the defect, shape of the defect, the proximity to free margins and the presence of a standing cone deformity a Burow's skin graft was deemed most appropriate. The standing cone was removed and this tissue was then trimmed to the shape of the primary defect. The adipose tissue was also removed until only dermis and epidermis were left.  The skin margins of the secondary defect were undermined to an appropriate distance in all directions utilizing iris scissors.  The secondary defect was closed with interrupted buried subcutaneous sutures.  The skin edges were then re-apposed with running  sutures.  The skin graft was then placed in the primary defect and oriented appropriately.
Cartilage Graft Text: The defect edges were debeveled with a #15 scalpel blade.  Given the location of the defect, shape of the defect, the fact the defect involved a full thickness cartilage defect a cartilage graft was deemed most appropriate.  An appropriate donor site was identified, cleansed, and anesthetized. The cartilage graft was then harvested and transferred to the recipient site, oriented appropriately and then sutured into place.  The secondary defect was then repaired using a primary closure.
Composite Graft Text: The defect edges were debeveled with a #15 scalpel blade.  Given the location of the defect, shape of the defect, the proximity to free margins and the fact the defect was full thickness a composite graft was deemed most appropriate.  The defect was outline and then transferred to the donor site.  A full thickness graft was then excised from the donor site. The graft was then placed in the primary defect, oriented appropriately and then sutured into place.  The secondary defect was then repaired using a primary closure.
Epidermal Autograft Text: The defect edges were debeveled with a #15 scalpel blade.  Given the location of the defect, shape of the defect and the proximity to free margins an epidermal autograft was deemed most appropriate.  Using a sterile surgical marker, the primary defect shape was transferred to the donor site. The epidermal graft was then harvested.  The skin graft was then placed in the primary defect and oriented appropriately.
Dermal Autograft Text: The defect edges were debeveled with a #15 scalpel blade.  Given the location of the defect, shape of the defect and the proximity to free margins a dermal autograft was deemed most appropriate.  Using a sterile surgical marker, the primary defect shape was transferred to the donor site. The area thus outlined was incised deep to adipose tissue with a #15 scalpel blade.  The harvested graft was then trimmed of adipose and epidermal tissue until only dermis was left.  The skin graft was then placed in the primary defect and oriented appropriately.
Skin Substitute Text: The defect edges were debeveled with a #15 scalpel blade.  Given the location of the defect, shape of the defect and the proximity to free margins a skin substitute graft was deemed most appropriate.  The graft material was trimmed to fit the size of the defect. The graft was then placed in the primary defect and oriented appropriately.
Skin Substitute Paste Text: The defect edges were debeveled with a #15 scalpel blade.  Given the location of the defect, shape of the defect and the proximity to free margins a skin substitute micronized graft was deemed most appropriate.  The entire vial contents were admixed with 0.5ccs of sterile saline, formed into a paste and then evenly spread over the entire wound bed.
Skin Substitute Injection Text: The defect edges were debeveled with a #15 scalpel blade.  Given the location of the defect, shape of the defect and the proximity to free margins a skin substitute micronized graft was deemed most appropriate.  The entire vial contents were admixed with 3.0ccs of sterile saline and then injected subcutaneously throughout the entire wound bed.
Tissue Cultured Epidermal Autograft Text: The defect edges were debeveled with a #15 scalpel blade.  Given the location of the defect, shape of the defect and the proximity to free margins a tissue cultured epidermal autograft was deemed most appropriate.  The graft was then trimmed to fit the size of the defect.  The graft was then placed in the primary defect and oriented appropriately.
Xenograft Text: The defect edges were debeveled with a #15 scalpel blade.  Given the location of the defect, shape of the defect and the proximity to free margins a xenograft was deemed most appropriate.  The graft was then trimmed to fit the size of the defect.  The graft was then placed in the primary defect and oriented appropriately.
Purse String (Simple) Text: Given the location of the defect and the characteristics of the surrounding skin a pursestring closure was deemed most appropriate.  Undermining was performed circumfirentially around the surgical defect.  A purstring suture was then placed and tightened.
Purse String (Intermediate) Text: Given the location of the defect and the characteristics of the surrounding skin a pursestring intermediate closure was deemed most appropriate.  Undermining was performed circumfirentially around the surgical defect.  A purstring suture was then placed and tightened.
Partial Purse String (Simple) Text: Given the location of the defect and the characteristics of the surrounding skin a simple purse string closure was deemed most appropriate.  Undermining was performed circumfirentially around the surgical defect.  A purse string suture was then placed and tightened. Wound tension only allowed a partial closure of the circular defect.
Partial Purse String (Intermediate) Text: Given the location of the defect and the characteristics of the surrounding skin an intermediate purse string closure was deemed most appropriate.  Undermining was performed circumfirentially around the surgical defect.  A purse string suture was then placed and tightened. Wound tension only allowed a partial closure of the circular defect.
Localized Dermabrasion Text: The patient was draped in routine manner.  Localized dermabrasion using 3 x 17 mm wire brush was performed in routine manner to papillary dermis. This spot dermabrasion is being performed to complete skin cancer reconstruction. It also will eliminate the other sun damaged precancerous cells that are known to be part of the regional effect of a lifetime's worth of sun exposure. This localized dermabrasion is therapeutic and should not be considered cosmetic in any regard.
Tarsorrhaphy Text: A tarsorrhaphy was performed using Frost sutures.
Complex Repair And Flap Additional Text (Will Appearing After The Standard Complex Repair Text): The complex repair was not sufficient to completely close the primary defect. The remaining additional defect was repaired with the flap mentioned below.
Complex Repair And Graft Additional Text (Will Appearing After The Standard Complex Repair Text): The complex repair was not sufficient to completely close the primary defect. The remaining additional defect was repaired with the graft mentioned below.
Cheek Interpolation Flap Division And Inset Text: Division and inset of the cheek interpolation flap was performed to achieve optimal aesthetic result, restore normal anatomic appearance and avoid distortion of normal anatomy, expedite and facilitate wound healing, achieve optimal functional result and because linear closure either not possible or would produce suboptimal result. The patient was prepped and draped in the usual manner. The pedicle was infiltrated with local anesthesia. The pedicle was sectioned with a #15 blade. The pedicle was de-bulked and trimmed to match the shape of the defect. Hemostasis was achieved. The flap donor site and free margin of the flap were secured with deep buried sutures and the wound edges were re-approximated.
Cheek To Nose Interpolation Flap Division And Inset Text: Division and inset of the cheek to nose interpolation flap was performed to achieve optimal aesthetic result, restore normal anatomic appearance and avoid distortion of normal anatomy, expedite and facilitate wound healing, achieve optimal functional result and because linear closure either not possible or would produce suboptimal result. The patient was prepped and draped in the usual manner. The pedicle was infiltrated with local anesthesia. The pedicle was sectioned with a #15 blade. The pedicle was de-bulked and trimmed to match the shape of the defect. Hemostasis was achieved. The flap donor site and free margin of the flap were secured with deep buried sutures and the wound edges were re-approximated.
Melolabial Interpolation Flap Division And Inset Text: Division and inset of the melolabial interpolation flap was performed to achieve optimal aesthetic result, restore normal anatomic appearance and avoid distortion of normal anatomy, expedite and facilitate wound healing, achieve optimal functional result and because linear closure either not possible or would produce suboptimal result. The patient was prepped and draped in the usual manner. The pedicle was infiltrated with local anesthesia. The pedicle was sectioned with a #15 blade. The pedicle was de-bulked and trimmed to match the shape of the defect. Hemostasis was achieved. The flap donor site and free margin of the flap were secured with deep buried sutures and the wound edges were re-approximated.
Mastoid Interpolation Flap Division And Inset Text: Division and inset of the mastoid interpolation flap was performed to achieve optimal aesthetic result, restore normal anatomic appearance and avoid distortion of normal anatomy, expedite and facilitate wound healing, achieve optimal functional result and because linear closure either not possible or would produce suboptimal result. The patient was prepped and draped in the usual manner. The pedicle was infiltrated with local anesthesia. The pedicle was sectioned with a #15 blade. The pedicle was de-bulked and trimmed to match the shape of the defect. Hemostasis was achieved. The flap donor site and free margin of the flap were secured with deep buried sutures and the wound edges were re-approximated.
Paramedian Forehead Flap Division And Inset Text: Division and inset of the paramedian forehead flap was performed to achieve optimal aesthetic result, restore normal anatomic appearance and avoid distortion of normal anatomy, expedite and facilitate wound healing, achieve optimal functional result and because linear closure either not possible or would produce suboptimal result. The patient was prepped and draped in the usual manner. The pedicle was infiltrated with local anesthesia. The pedicle was sectioned with a #15 blade. The pedicle was de-bulked and trimmed to match the shape of the defect. Hemostasis was achieved. The flap donor site and free margin of the flap were secured with deep buried sutures and the wound edges were re-approximated.
Posterior Auricular Interpolation Flap Division And Inset Text: Division and inset of the posterior auricular interpolation flap was performed to achieve optimal aesthetic result, restore normal anatomic appearance and avoid distortion of normal anatomy, expedite and facilitate wound healing, achieve optimal functional result and because linear closure either not possible or would produce suboptimal result. The patient was prepped and draped in the usual manner. The pedicle was infiltrated with local anesthesia. The pedicle was sectioned with a #15 blade. The pedicle was de-bulked and trimmed to match the shape of the defect. Hemostasis was achieved. The flap donor site and free margin of the flap were secured with deep buried sutures and the wound edges were re-approximated.
Manual Repair Warning Statement: We plan on removing the manually selected variable below in favor of our much easier automatic structured text blocks found in the previous tab. We decided to do this to help make the flow better and give you the full power of structured data. Manual selection is never going to be ideal in our platform and I would encourage you to avoid using manual selection from this point on, especially since I will be sunsetting this feature. It is important that you do one of two things with the customized text below. First, you can save all of the text in a word file so you can have it for future reference. Second, transfer the text to the appropriate area in the Library tab. Lastly, if there is a flap or graft type which we do not have you need to let us know right away so I can add it in before the variable is hidden. No need to panic, we plan to give you roughly 6 months to make the change.
Consent: The rationale for reconstruction was explained to the patient and consent was obtained. The risks, benefits and alternatives to therapy were discussed in detail. Specifically, the risks of infection, scarring, bleeding, bruising/swelling, prolonged wound healing, allergy to anesthesia, nerve injury, sensory changes, graft necrosis, and pigment changes were addressed. Prior to the procedure, the treatment site was clearly identified and confirmed by the patient. All components of Universal Protocol/PAUSE Rule completed.
Post-Care Instructions: Detailed post op wound care instructions were reviewed with the patient, and written instructions were provided. The patient is not to engage in any heavy lifting, exercise, or swimming for at least 10-14 days. The patient was encouraged to contact the office with any post-operative concerns.
Pain Refusal Text: I offered to prescribe pain medication but the patient refused to take this medication.
Where Do You Want The Question To Include Opioid Counseling Located?: Case Summary Tab
Information: Selecting Yes will display possible errors in your note based on the variables you have selected. This validation is only offered as a suggestion for you. PLEASE NOTE THAT THE VALIDATION TEXT WILL BE REMOVED WHEN YOU FINALIZE YOUR NOTE. IF YOU WANT TO FAX A PRELIMINARY NOTE YOU WILL NEED TO TOGGLE THIS TO 'NO' IF YOU DO NOT WANT IT IN YOUR FAXED NOTE.